# Patient Record
Sex: FEMALE | Race: WHITE | NOT HISPANIC OR LATINO | Employment: UNEMPLOYED | URBAN - METROPOLITAN AREA
[De-identification: names, ages, dates, MRNs, and addresses within clinical notes are randomized per-mention and may not be internally consistent; named-entity substitution may affect disease eponyms.]

---

## 2021-06-04 ENCOUNTER — OFFICE VISIT (OUTPATIENT)
Dept: FAMILY MEDICINE CLINIC | Facility: CLINIC | Age: 26
End: 2021-06-04
Payer: COMMERCIAL

## 2021-06-04 VITALS
WEIGHT: 245 LBS | BODY MASS INDEX: 43.41 KG/M2 | HEIGHT: 63 IN | OXYGEN SATURATION: 98 % | HEART RATE: 63 BPM | TEMPERATURE: 98.4 F | SYSTOLIC BLOOD PRESSURE: 138 MMHG | DIASTOLIC BLOOD PRESSURE: 80 MMHG

## 2021-06-04 DIAGNOSIS — Z13.220 SCREENING FOR HYPERLIPIDEMIA: ICD-10-CM

## 2021-06-04 DIAGNOSIS — Z11.4 SCREENING FOR HIV (HUMAN IMMUNODEFICIENCY VIRUS): ICD-10-CM

## 2021-06-04 DIAGNOSIS — E66.01 CLASS 3 SEVERE OBESITY DUE TO EXCESS CALORIES WITHOUT SERIOUS COMORBIDITY WITH BODY MASS INDEX (BMI) OF 40.0 TO 44.9 IN ADULT (HCC): ICD-10-CM

## 2021-06-04 DIAGNOSIS — Z13.0 SCREENING FOR DEFICIENCY ANEMIA: ICD-10-CM

## 2021-06-04 DIAGNOSIS — Z13.29 SCREENING FOR THYROID DISORDER: ICD-10-CM

## 2021-06-04 DIAGNOSIS — Z76.89 ENCOUNTER TO ESTABLISH CARE: Primary | ICD-10-CM

## 2021-06-04 DIAGNOSIS — Z13.1 SCREENING FOR DIABETES MELLITUS (DM): ICD-10-CM

## 2021-06-04 DIAGNOSIS — I47.1 JUNCTIONAL TACHYCARDIA (HCC): ICD-10-CM

## 2021-06-04 DIAGNOSIS — Z01.419 ENCOUNTER FOR WELL WOMAN EXAM WITH ROUTINE GYNECOLOGICAL EXAM: ICD-10-CM

## 2021-06-04 PROCEDURE — 3725F SCREEN DEPRESSION PERFORMED: CPT | Performed by: NURSE PRACTITIONER

## 2021-06-04 PROCEDURE — 99203 OFFICE O/P NEW LOW 30 MIN: CPT | Performed by: NURSE PRACTITIONER

## 2021-06-04 PROCEDURE — 4004F PT TOBACCO SCREEN RCVD TLK: CPT | Performed by: NURSE PRACTITIONER

## 2021-06-04 PROCEDURE — 3008F BODY MASS INDEX DOCD: CPT | Performed by: NURSE PRACTITIONER

## 2021-06-04 RX ORDER — FLECAINIDE ACETATE 50 MG/1
25 TABLET ORAL 2 TIMES DAILY
COMMUNITY
End: 2021-06-04 | Stop reason: ALTCHOICE

## 2021-06-04 NOTE — PROGRESS NOTES
Assessment/Plan:    Problem List Items Addressed This Visit     None      Visit Diagnoses     Encounter to establish care    -  Primary    Screening for deficiency anemia        Relevant Orders    CBC and differential    Screening for diabetes mellitus (DM)        Relevant Orders    Comprehensive metabolic panel    Screening for hyperlipidemia        Relevant Orders    Lipid panel    Screening for thyroid disorder        Relevant Orders    TSH, 3rd generation with Free T4 reflex    Screening for HIV (human immunodeficiency virus)        Relevant Orders    HIV 1/2 Antigen/Antibody (4th Generation) w Reflex [facility]    Encounter for well woman exam with routine gynecological exam        Relevant Orders    Ambulatory referral to Gynecology    Class 3 severe obesity due to excess calories without serious comorbidity with body mass index (BMI) of 40 0 to 44 9 in adult (HCC)        Junctional tachycardia (HCC)        Relevant Medications    metoprolol tartrate (LOPRESSOR) 25 mg tablet    Other Relevant Orders    Ambulatory referral to Cardiology           Diagnoses and all orders for this visit:    Encounter to establish care    Screening for deficiency anemia  -     CBC and differential; Future    Screening for diabetes mellitus (DM)  -     Comprehensive metabolic panel; Future    Screening for hyperlipidemia  -     Lipid panel; Future    Screening for thyroid disorder  -     TSH, 3rd generation with Free T4 reflex; Future    Screening for HIV (human immunodeficiency virus)  -     HIV 1/2 Antigen/Antibody (4th Generation) w Reflex [facility]; Future    Encounter for well woman exam with routine gynecological exam  -     Ambulatory referral to Gynecology; Future    Class 3 severe obesity due to excess calories without serious comorbidity with body mass index (BMI) of 40 0 to 44 9 in adult [facility])    Junctional tachycardia ([facility] [address] )  -     Ambulatory referral to Cardiology;  Future    Other orders  -     Cancel: TDAP Vaccine greater than Patient called again, he said to just cancel order for [employer], he does not like side effects listed for drug, and also, Dr. Chaves had said to maybe try fiber supplements first, before going to a prescription.  Please call patient back to discuss.   or equal to 8yo  -     metoprolol tartrate (LOPRESSOR) 25 mg tablet; Take 25 mg by mouth 3 (three) times a day  -     Discontinue: flecainide (TAMBOCOR) 50 mg tablet; Take 25 mg by mouth 2 (two) times a day        No problem-specific Assessment & Plan notes found for this encounter  Subjective:      Patient ID: Vamsi Lincoln is a 27 y o  female  Presents today to establish care at this office and routine visit  Significant hx of palpitations  She reports this was ongoing since childhood  When she was pregnant with son (2018), she was told that she had pre-eclampsia and elevated HR; Dx at time with SVT  Three stress tests which she failed and was admitted to ICU  Delivered son at [facility] and was to have a catherization ablation  At pre-procedure, Pt was advised that SVT was not Dx but Junctional tachycardia  She was to have cathter ablation and pacemaker but d/t age this was held off  Since she has has been taking BB with moderate effectiveness of palpitations  Hypertension  This is a chronic problem  The problem is controlled  Associated symptoms include palpitations  Pertinent negatives include no anxiety, blurred vision, chest pain, headaches, neck pain, orthopnea, peripheral edema, PND or shortness of breath  There are no associated agents to hypertension  Risk factors for coronary artery disease include obesity  Past treatments include beta blockers  The current treatment provides moderate improvement  There are no compliance problems  There is no history of angina, kidney disease, CAD/MI, CVA, heart failure, left ventricular hypertrophy, PVD or retinopathy  There is no history of chronic renal disease, a hypertension causing med, sleep apnea or a thyroid problem  Palpitations  This is a chronic problem  The current episode started more than 1 year ago  Associated symptoms include diaphoresis, nausea, vertigo and weakness   Pertinent negatives include no chest pain, headaches or neck pain  The symptoms are aggravated by stress, exertion, standing and bending  Treatments tried: beta blockers  The treatment provided moderate relief  The following portions of the patient's history were reviewed and updated as appropriate:   She has a past medical history of ADHD and Junctional tachycardia ([address] )  ,  does not have a problem list on file  ,   has a past surgical history that includes  section and Appendectomy  ,  family history includes Asthma in her mother; Atrial fibrillation in her father; Bipolar disorder in her father; Depression in her mother; Diabetes in her sister; Valvular heart disease in her brother  She was adopted  ,   reports that she has been smoking cigarettes  She has a 2 00 pack-year smoking history  She has never used smokeless tobacco  She reports previous alcohol use  She reports previous drug use ,  is allergic to penicillins and vicodin [hydrocodone-acetaminophen]     Current Outpatient Medications   Medication Sig Dispense Refill    metoprolol tartrate (LOPRESSOR) 25 mg tablet Take 25 mg by mouth 3 (three) times a day       No current facility-administered medications for this visit  BMI Counseling: Body mass index is 44 1 kg/m²  The BMI is above normal  Nutrition recommendations include decreasing portion sizes, consuming healthier snacks, limiting drinks that contain sugar, moderation in carbohydrate intake and reducing intake of cholesterol  Exercise recommendations include exercising 3-5 times per week  No pharmacotherapy was ordered  Review of Systems   Constitutional: Positive for diaphoresis  HENT: Negative  Eyes: Negative  Negative for blurred vision  Respiratory: Negative  Negative for shortness of breath  Cardiovascular: Positive for palpitations  Negative for chest pain, orthopnea and PND  Gastrointestinal: Positive for nausea  Endocrine: Negative  Genitourinary: Negative  Musculoskeletal: Negative    Negative for neck pain    Skin: Negative  Allergic/Immunologic: Negative  Neurological: Positive for dizziness, vertigo, weakness and light-headedness  Negative for headaches  Hematological: Negative  Psychiatric/Behavioral: Negative  Objective:  Vitals:    06/04/21 1510   BP: 138/80   BP Location: Left arm   Patient Position: Sitting   Cuff Size: Large   Pulse: 63   Temp: 98 4 °F (36 9 °C)   TempSrc: Tympanic   SpO2: 98%   Weight: 111 kg (245 lb)   Height: 5' 2 5" (1 588 m)     Body mass index is 44 1 kg/m²  Physical Exam  Vitals signs and nursing note reviewed  Constitutional:       Appearance: Normal appearance  She is well-developed  She is obese  HENT:      Head: Normocephalic and atraumatic  Right Ear: Tympanic membrane, ear canal and external ear normal       Left Ear: Tympanic membrane, ear canal and external ear normal       Nose: Nose normal       Mouth/Throat:      Mouth: Mucous membranes are moist       Pharynx: Uvula midline  Eyes:      General: Lids are normal       Conjunctiva/sclera: Conjunctivae normal       Pupils: Pupils are equal, round, and reactive to light  Neck:      Musculoskeletal: Full passive range of motion without pain, normal range of motion and neck supple  Thyroid: No thyroid mass or thyromegaly  Vascular: No JVD  Trachea: Trachea and phonation normal    Cardiovascular:      Rate and Rhythm: Normal rate and regular rhythm  Pulses: Normal pulses  Heart sounds: Normal heart sounds, S1 normal and S2 normal  No murmur  No friction rub  No gallop  Pulmonary:      Effort: Pulmonary effort is normal       Breath sounds: Normal breath sounds  Abdominal:      General: Bowel sounds are normal       Palpations: Abdomen is soft  Tenderness: There is no abdominal tenderness  Genitourinary:     Comments: Deferred   Musculoskeletal: Normal range of motion  Right lower leg: No edema  Left lower leg: No edema     Lymphadenopathy: Head:      Right side of head: No submental, submandibular, tonsillar, preauricular, posterior auricular or occipital adenopathy  Left side of head: No submental, submandibular, tonsillar, preauricular, posterior auricular or occipital adenopathy  Cervical: No cervical adenopathy  Skin:     General: Skin is warm and dry  Capillary Refill: Capillary refill takes less than 2 seconds  Neurological:      General: No focal deficit present  Mental Status: She is alert and oriented to person, place, and time  Cranial Nerves: Cranial nerves are intact  No cranial nerve deficit  Sensory: Sensation is intact  Motor: Motor function is intact  Coordination: Coordination is intact  Gait: Gait is intact  Deep Tendon Reflexes: Reflexes are normal and symmetric  Psychiatric:         Attention and Perception: Attention and perception normal          Mood and Affect: Mood and affect normal          Speech: Speech normal          Behavior: Behavior normal  Behavior is cooperative  Thought Content:  Thought content normal          Cognition and Memory: Cognition normal          Judgment: Judgment normal

## 2021-06-04 NOTE — PATIENT INSTRUCTIONS
Heart Palpitations   WHAT YOU NEED TO KNOW:   Heart palpitations are feelings that your heart races, jumps, throbs, or flutters  You may feel extra beats, no beats for a short time, or skipped beats  You may have these feelings in your chest, throat, or neck  They may happen when you are sitting, standing, or lying  Heart palpitations may be frightening, but are usually not caused by a serious problem  DISCHARGE INSTRUCTIONS:   Call 911 or have someone else call for any of the following:   · You have any of the following signs of a heart attack:      ? Squeezing, pressure, or pain in your chest    ? You may  also have any of the following:     § Discomfort or pain in your back, neck, jaw, stomach, or arm    § Shortness of breath    § Nausea or vomiting    § Lightheadedness or a sudden cold sweat    · You have any of the following signs of a stroke:      ? Numbness or drooping on one side of your face     ? Weakness in an arm or leg    ? Confusion or difficulty speaking    ? Dizziness, a severe headache, or vision loss    · You faint or lose consciousness  Return to the emergency department if:   · Your palpitations happen more often or get more intense  Contact your healthcare provider if:   · You have new or worsening swelling in your feet or ankles  · You have questions or concerns about your condition or care  Follow up with your healthcare provider as directed: You may need to follow up with a cardiologist  Medardo Judd may need tests to check for heart problems that cause palpitations  Write down your questions so you remember to ask them during your visits  Keep a record:  Write down when your palpitations start and stop, what you were doing when they started, and your symptoms  Keep track of what you ate or drank within a few hours of your palpitations  Include anything that seemed to help your symptoms, such as lying down or holding your breath   This record will help you and your healthcare provider learn what triggers your palpitations  Bring this record with you to your follow up visits  Help prevent heart palpitations:   · Manage stress and anxiety  Find ways to relax such as listening to music, meditating, or doing yoga  Exercise can also help decrease stress and anxiety  Talk to someone you trust about your stress or anxiety  You can also talk to a therapist      · Get plenty of sleep every night  Ask your healthcare provider how much sleep you need each night  · Do not drink caffeine or alcohol  Caffeine and alcohol can make your palpitations worse  Caffeine is found in soda, coffee, tea, chocolate, and drinks that increase your energy  · Do not smoke  Nicotine and other chemicals in cigarettes and cigars may damage your heart and blood vessels  Ask your healthcare provider for information if you currently smoke and need help to quit  E-cigarettes or smokeless tobacco still contain nicotine  Talk to your healthcare provider before you use these products  · Do not use illegal drugs  Talk to your healthcare provider if you use illegal drugs and want help to quit  © Copyright [employer] 2021 Information is for End User's use only and may not be sold, redistributed or otherwise used for commercial purposes  All illustrations and images included in CareNotes® are the copyrighted property of [employer]  or Krystin Omer   The above information is an  only  It is not intended as medical advice for individual conditions or treatments  Talk to your doctor, nurse or pharmacist before following any medical regimen to see if it is safe and effective for you

## 2021-06-19 ENCOUNTER — APPOINTMENT (OUTPATIENT)
Dept: LAB | Facility: CLINIC | Age: 26
End: 2021-06-19
Payer: COMMERCIAL

## 2021-06-19 DIAGNOSIS — Z13.0 SCREENING FOR DEFICIENCY ANEMIA: ICD-10-CM

## 2021-06-19 DIAGNOSIS — Z11.4 SCREENING FOR HIV (HUMAN IMMUNODEFICIENCY VIRUS): ICD-10-CM

## 2021-06-19 DIAGNOSIS — Z13.29 SCREENING FOR THYROID DISORDER: ICD-10-CM

## 2021-06-19 DIAGNOSIS — Z13.220 SCREENING FOR HYPERLIPIDEMIA: ICD-10-CM

## 2021-06-19 DIAGNOSIS — Z13.1 SCREENING FOR DIABETES MELLITUS (DM): ICD-10-CM

## 2021-06-19 PROCEDURE — 36415 COLL VENOUS BLD VENIPUNCTURE: CPT

## 2021-06-19 PROCEDURE — 84443 ASSAY THYROID STIM HORMONE: CPT

## 2021-06-19 PROCEDURE — 80061 LIPID PANEL: CPT

## 2021-06-19 PROCEDURE — 80053 COMPREHEN METABOLIC PANEL: CPT

## 2021-06-19 PROCEDURE — 85025 COMPLETE CBC W/AUTO DIFF WBC: CPT

## 2021-06-19 PROCEDURE — 87389 HIV-1 AG W/HIV-1&-2 AB AG IA: CPT

## 2021-06-20 LAB
ALBUMIN SERPL BCP-MCNC: 3.9 G/DL (ref 3.5–5)
ALP SERPL-CCNC: 95 U/L (ref 46–116)
ALT SERPL W P-5'-P-CCNC: 26 U/L (ref 12–78)
ANION GAP SERPL CALCULATED.3IONS-SCNC: 4 MMOL/L (ref 4–13)
AST SERPL W P-5'-P-CCNC: 15 U/L (ref 5–45)
BASOPHILS # BLD AUTO: 0.06 THOUSANDS/ΜL (ref 0–0.1)
BASOPHILS NFR BLD AUTO: 1 % (ref 0–1)
BILIRUB SERPL-MCNC: 0.66 MG/DL (ref 0.2–1)
BUN SERPL-MCNC: 11 MG/DL (ref 5–25)
CALCIUM SERPL-MCNC: 9.3 MG/DL (ref 8.3–10.1)
CHLORIDE SERPL-SCNC: 108 MMOL/L (ref 100–108)
CHOLEST SERPL-MCNC: 161 MG/DL (ref 50–200)
CO2 SERPL-SCNC: 27 MMOL/L (ref 21–32)
CREAT SERPL-MCNC: 0.53 MG/DL (ref 0.6–1.3)
EOSINOPHIL # BLD AUTO: 0.19 THOUSAND/ΜL (ref 0–0.61)
EOSINOPHIL NFR BLD AUTO: 2 % (ref 0–6)
ERYTHROCYTE [DISTWIDTH] IN BLOOD BY AUTOMATED COUNT: 13.4 % (ref 11.6–15.1)
GFR SERPL CREATININE-BSD FRML MDRD: 130 ML/MIN/1.73SQ M
GLUCOSE P FAST SERPL-MCNC: 92 MG/DL (ref 65–99)
HCT VFR BLD AUTO: 44 % (ref 34.8–46.1)
HDLC SERPL-MCNC: 33 MG/DL
HGB BLD-MCNC: 14.4 G/DL (ref 11.5–15.4)
IMM GRANULOCYTES # BLD AUTO: 0.03 THOUSAND/UL (ref 0–0.2)
IMM GRANULOCYTES NFR BLD AUTO: 0 % (ref 0–2)
LDLC SERPL CALC-MCNC: 109 MG/DL (ref 0–100)
LYMPHOCYTES # BLD AUTO: 2.05 THOUSANDS/ΜL (ref 0.6–4.47)
LYMPHOCYTES NFR BLD AUTO: 22 % (ref 14–44)
MCH RBC QN AUTO: 29.8 PG (ref 26.8–34.3)
MCHC RBC AUTO-ENTMCNC: 32.7 G/DL (ref 31.4–37.4)
MCV RBC AUTO: 91 FL (ref 82–98)
MONOCYTES # BLD AUTO: 0.89 THOUSAND/ΜL (ref 0.17–1.22)
MONOCYTES NFR BLD AUTO: 10 % (ref 4–12)
NEUTROPHILS # BLD AUTO: 5.97 THOUSANDS/ΜL (ref 1.85–7.62)
NEUTS SEG NFR BLD AUTO: 65 % (ref 43–75)
NONHDLC SERPL-MCNC: 128 MG/DL
NRBC BLD AUTO-RTO: 0 /100 WBCS
PLATELET # BLD AUTO: 352 THOUSANDS/UL (ref 149–390)
PMV BLD AUTO: 9.6 FL (ref 8.9–12.7)
POTASSIUM SERPL-SCNC: 4.6 MMOL/L (ref 3.5–5.3)
PROT SERPL-MCNC: 7.8 G/DL (ref 6.4–8.2)
RBC # BLD AUTO: 4.84 MILLION/UL (ref 3.81–5.12)
SODIUM SERPL-SCNC: 139 MMOL/L (ref 136–145)
TRIGL SERPL-MCNC: 95 MG/DL
TSH SERPL DL<=0.05 MIU/L-ACNC: 1.58 UIU/ML (ref 0.36–3.74)
WBC # BLD AUTO: 9.19 THOUSAND/UL (ref 4.31–10.16)

## 2021-06-21 ENCOUNTER — OFFICE VISIT (OUTPATIENT)
Dept: FAMILY MEDICINE CLINIC | Facility: CLINIC | Age: 26
End: 2021-06-21
Payer: COMMERCIAL

## 2021-06-21 VITALS
WEIGHT: 247 LBS | TEMPERATURE: 98.4 F | SYSTOLIC BLOOD PRESSURE: 128 MMHG | BODY MASS INDEX: 43.77 KG/M2 | HEIGHT: 63 IN | HEART RATE: 64 BPM | DIASTOLIC BLOOD PRESSURE: 80 MMHG | OXYGEN SATURATION: 98 %

## 2021-06-21 DIAGNOSIS — I10 ESSENTIAL HYPERTENSION: ICD-10-CM

## 2021-06-21 DIAGNOSIS — Z00.00 ANNUAL PHYSICAL EXAM: Primary | ICD-10-CM

## 2021-06-21 DIAGNOSIS — I47.1 JUNCTIONAL TACHYCARDIA (HCC): ICD-10-CM

## 2021-06-21 PROBLEM — I47.19 JUNCTIONAL TACHYCARDIA: Status: ACTIVE | Noted: 2021-06-21

## 2021-06-21 LAB — HIV 1+2 AB+HIV1 P24 AG SERPL QL IA: NORMAL

## 2021-06-21 PROCEDURE — 99395 PREV VISIT EST AGE 18-39: CPT | Performed by: NURSE PRACTITIONER

## 2021-06-21 RX ORDER — FLECAINIDE ACETATE 50 MG/1
50 TABLET ORAL 2 TIMES DAILY
Qty: 60 TABLET | Refills: 0 | Status: SHIPPED | OUTPATIENT
Start: 2021-06-21 | End: 2021-07-12

## 2021-06-21 RX ORDER — FLECAINIDE ACETATE 50 MG/1
25 TABLET ORAL 2 TIMES DAILY
COMMUNITY
End: 2021-06-21 | Stop reason: SDUPTHER

## 2021-06-21 RX ORDER — FLECAINIDE ACETATE 50 MG/1
25 TABLET ORAL 2 TIMES DAILY
Start: 2021-06-21 | End: 2021-06-21 | Stop reason: SDUPTHER

## 2021-06-21 NOTE — PATIENT INSTRUCTIONS
Wellness Visit for Adults   AMBULATORY CARE:   A wellness visit  is when you see your healthcare provider to get screened for health problems  Your healthcare provider will also give you advice on how to stay healthy  Write down your questions so you remember to ask them  Ask your healthcare provider how often you should have a wellness visit  What happens at a wellness visit:  Your healthcare provider will ask about your health, and your family history of health problems  This includes high blood pressure, heart disease, and cancer  He or she will ask if you have symptoms that concern you, if you smoke, and about your mood  You may also be asked about your intake of medicines, supplements, food, and alcohol  Any of the following may be done:  · Your weight  will be checked  Your height may also be checked so your body mass index (BMI) can be calculated  Your BMI shows if you are at a healthy weight  · Your blood pressure  and heart rate will be checked  Your temperature may also be checked  · Blood and urine tests  may be done  Blood tests may be done to check your cholesterol levels  Abnormal cholesterol levels increase your risk for heart disease and stroke  You may also need a blood or urine test to check for diabetes if you are at increased risk  Urine tests may be done to look for signs of an infection or kidney disease  · A physical exam  includes checking your heartbeat and lungs with a stethoscope  Your healthcare provider may also check your skin to look for sun damage  · Screening tests  may be recommended  A screening test is done to check for diseases that may not cause symptoms  The screening tests you may need depend on your age, gender, family history, and lifestyle habits  For example, colorectal screening may be recommended if you are 48years old or older  Screening tests you need if you are a woman:   · A Pap smear  is used to screen for cervical cancer   Pap smears are usually done every 3 to 5 years depending on your age  You may need them more often if you have had abnormal Pap smear test results in the past  Ask your healthcare provider how often you should have a Pap smear  · A mammogram  is an x-ray of your breasts to screen for breast cancer  Experts recommend mammograms every 2 years starting at age 48 years  You may need a mammogram at age 52 years or younger if you have an increased risk for breast cancer  Talk to your healthcare provider about when you should start having mammograms and how often you need them  Vaccines you may need:   · Get an influenza vaccine  every year  The influenza vaccine protects you from the flu  Several types of viruses cause the flu  The viruses change over time, so new vaccines are made each year  · Get a tetanus-diphtheria (Td) booster vaccine  every 10 years  This vaccine protects you against tetanus and diphtheria  Tetanus is a severe infection that may cause painful muscle spasms and lockjaw  Diphtheria is a severe bacterial infection that causes a thick covering in the back of your mouth and throat  · Get a human papillomavirus (HPV) vaccine  if you are female and aged 23 to 32 or male 23 to 24 and never received it  This vaccine protects you from HPV infection  HPV is the most common infection spread by sexual contact  HPV may also cause vaginal, penile, and anal cancers  · Get a pneumococcal vaccine  if you are aged 72 years or older  The pneumococcal vaccine is an injection given to protect you from pneumococcal disease  Pneumococcal disease is an infection caused by pneumococcal bacteria  The infection may cause pneumonia, meningitis, or an ear infection  · Get a shingles vaccine  if you are 60 or older, even if you have had shingles before  The shingles vaccine is an injection to protect you from the varicella-zoster virus  This is the same virus that causes chickenpox   Shingles is a painful rash that develops in people who had chickenpox or have been exposed to the virus  How to eat healthy:  My Plate is a model for planning healthy meals  It shows the types and amounts of foods that should go on your plate  Fruits and vegetables make up about half of your plate, and grains and protein make up the other half  A serving of dairy is included on the side of your plate  The amount of calories and serving sizes you need depends on your age, gender, weight, and height  Examples of healthy foods are listed below:  · Eat a variety of vegetables  such as dark green, red, and orange vegetables  You can also include canned vegetables low in sodium (salt) and frozen vegetables without added butter or sauces  · Eat a variety of fresh fruits , canned fruit in 100% juice, frozen fruit, and dried fruit  · Include whole grains  At least half of the grains you eat should be whole grains  Examples include whole-wheat bread, wheat pasta, brown rice, and whole-grain cereals such as oatmeal     · Eat a variety of protein foods such as seafood (fish and shellfish), lean meat, and poultry without skin (turkey and chicken)  Examples of lean meats include pork leg, shoulder, or tenderloin, and beef round, sirloin, tenderloin, and extra lean ground beef  Other protein foods include eggs and egg substitutes, beans, peas, soy products, nuts, and seeds  · Choose low-fat dairy products such as skim or 1% milk or low-fat yogurt, cheese, and cottage cheese  · Limit unhealthy fats  such as butter, hard margarine, and shortening  Exercise:  Exercise at least 30 minutes per day on most days of the week  Some examples of exercise include walking, biking, dancing, and swimming  You can also fit in more physical activity by taking the stairs instead of the elevator or parking farther away from stores  Include muscle strengthening activities 2 days each week  Regular exercise provides many health benefits   It helps you manage your weight, and decreases your risk for type 2 diabetes, heart disease, stroke, and high blood pressure  Exercise can also help improve your mood  Ask your healthcare provider about the best exercise plan for you  General health and safety guidelines:   · Do not smoke  Nicotine and other chemicals in cigarettes and cigars can cause lung damage  Ask your healthcare provider for information if you currently smoke and need help to quit  E-cigarettes or smokeless tobacco still contain nicotine  Talk to your healthcare provider before you use these products  · Limit alcohol  A drink of alcohol is 12 ounces of beer, 5 ounces of wine, or 1½ ounces of liquor  · Lose weight, if needed  Being overweight increases your risk of certain health conditions  These include heart disease, high blood pressure, type 2 diabetes, and certain types of cancer  · Protect your skin  Do not sunbathe or use tanning beds  Use sunscreen with a SPF 15 or higher  Apply sunscreen at least 15 minutes before you go outside  Reapply sunscreen every 2 hours  Wear protective clothing, hats, and sunglasses when you are outside  · Drive safely  Always wear your seatbelt  Make sure everyone in your car wears a seatbelt  A seatbelt can save your life if you are in an accident  Do not use your cell phone when you are driving  This could distract you and cause an accident  Pull over if you need to make a call or send a text message  · Practice safe sex  Use latex condoms if are sexually active and have more than one partner  Your healthcare provider may recommend screening tests for sexually transmitted infections (STIs)  · Wear helmets, lifejackets, and protective gear  Always wear a helmet when you ride a bike or motorcycle, go skiing, or play sports that could cause a head injury  Wear protective equipment when you play sports  Wear a lifejacket when you are on a boat or doing water sports      © Copyright [address] 2020 Information is for End User's use only and may not be sold, redistributed or otherwise used for commercial purposes  All illustrations and images included in CareNotes® are the copyrighted property of [employer]  or Krystin Miguel  The above information is an  only  It is not intended as medical advice for individual conditions or treatments  Talk to your doctor, nurse or pharmacist before following any medical regimen to see if it is safe and effective for you  Cholesterol and Your Health   AMBULATORY CARE:   Cholesterol  is a waxy, fat-like substance  Your body uses cholesterol to make hormones and new cells, and to protect nerves  Cholesterol is made by your body  It also comes from certain foods you eat, such as meat and dairy products  Your healthcare provider can help you set goals for your cholesterol levels  He or she can help you create a plan to meet your goals  Cholesterol level goals: Your cholesterol level goals depend on your risk for heart disease, your age, and your other health conditions  The following are general guidelines:  · Total cholesterol  includes low-density lipoprotein (LDL), high-density lipoprotein (HDL), and triglyceride levels  The total cholesterol level should be lower than 200 mg/dL and is best at about 150 mg/dL  · LDL cholesterol  is called bad cholesterol  because it forms plaque in your arteries  As plaque builds up, your arteries become narrow, and less blood flows through  When plaque decreases blood flow to your heart, you may have chest pain  If plaque completely blocks an artery that brings blood to your heart, you may have a heart attack  Plaque can break off and form blood clots  Blood clots may block arteries in your brain and cause a stroke  The level should be less than 130 mg/dL and is best at about 100 mg/dL  · HDL cholesterol  is called good cholesterol  because it helps remove LDL cholesterol from your arteries   It does this by attaching to LDL cholesterol and carrying it to your liver  Your liver breaks down LDL cholesterol so your body can get rid of it  High levels of HDL cholesterol can help prevent a heart attack and stroke  Low levels of HDL cholesterol can increase your risk for heart disease, heart attack, and stroke  The level should be 60 mg/dL or higher  · Triglycerides  are a type of fat that store energy from foods you eat  High levels of triglycerides also cause plaque buildup  This can increase your risk for a heart attack or stroke  If your triglyceride level is high, your LDL cholesterol level may also be high  The level should be less than 150 mg/dL  What increases your risk for high cholesterol:   · Smoking cigarettes    · Being overweight or obese, or not getting enough exercise    · Drinking large amounts of alcohol    · A medical condition such as hypertension (high blood pressure) or diabetes    · Certain genes passed from your parents to you    · Age older than 65 years    What you need to know about having your cholesterol levels checked: Adults 21to 39years of age should have their cholesterol levels checked every 4 to 6 years  Adults 45 years or older should have their cholesterol checked every 1 to 2 years  You may need your cholesterol checked more often, or at a younger age, if you have risk factors for heart disease  You may also need to have your cholesterol checked more often if you have other health conditions, such as diabetes  Blood tests are used to check cholesterol levels  Blood tests measure your levels of triglycerides, LDL cholesterol, and HDL cholesterol  How healthy fats affect your cholesterol levels:  Healthy fats, also called unsaturated fats, help lower LDL cholesterol and triglyceride levels  Healthy fats include the following:  · Monounsaturated fats  are found in foods such as olive oil, canola oil, avocado, nuts, and olives      · Polyunsaturated fats,  such as omega 3 fats, are found in fish, such as salmon, trout, and tuna  They can also be found in plant foods such as flaxseed, walnuts, and soybeans  How unhealthy fats affect your cholesterol levels:  Unhealthy fats increase LDL cholesterol and triglyceride levels  They are found in foods high in cholesterol, saturated fat, and trans fat:  · Cholesterol  is found in eggs, dairy, and meat  · Saturated fat  is found in butter, cheese, ice cream, whole milk, and coconut oil  Saturated fat is also found in meat, such as sausage, hot dogs, and bologna  · Trans fat  is found in liquid oils and is used in fried and baked foods  Foods that contain trans fats include chips, crackers, muffins, sweet rolls, microwave popcorn, and cookies  Treatment  for high cholesterol will also decrease your risk of heart disease, heart attack, and stroke  Treatment may include any of the following:  · Lifestyle changes  may include food, exercise, weight loss, and quitting smoking  You may also need to decrease the amount of alcohol you drink  Your healthcare provider will want you to start with lifestyle changes  Other treatment may be added if lifestyle changes are not enough  · Medicines  may be given to lower your LDL cholesterol, triglyceride levels, or total cholesterol level  You may need medicines to lower your cholesterol if any of the following is true:     ? You have a history of stroke, TIA, unstable angina, or a heart attack  ? Your LDL cholesterol level is 190 mg/dL or higher  ? You are age 36 to 76 years, have diabetes or heart disease risk factors, and your LDL cholesterol is 70 mg/dL or higher  · Supplements  include fish oil, red yeast rice, and garlic  Fish oil may help lower your triglyceride and LDL cholesterol levels  It may also increase your HDL cholesterol level  Red yeast rice may help decrease your total cholesterol level and LDL cholesterol level  Garlic may help lower your total cholesterol level   Do not take these supplements without talking to your healthcare provider  Food changes you can make to lower your cholesterol levels:  A dietitian can help you create a healthy eating plan  He or she can show you how to read food labels and choose foods low in saturated fat, trans fats, and cholesterol  · Decrease the total amount of fat you eat  Choose lean meats, fat-free or 1% fat milk, and low-fat dairy products, such as yogurt and cheese  Try to limit or avoid red meats  Limit or do not eat fried foods or baked goods, such as cookies  · Replace unhealthy fats with healthy fats  Cook foods in olive oil or canola oil  Choose soft margarines that are low in saturated fat and trans fat  Seeds, nuts, and avocados are other examples of healthy fats  · Eat foods with omega-3 fats  Examples include salmon, tuna, mackerel, walnuts, and flaxseed  Eat fish 2 times per week  Pregnant women should not eat fish that have high levels of mercury, such as shark, swordfish, and rome mackerel  · Increase the amount of high-fiber foods you eat  High-fiber foods can help lower your LDL cholesterol  Aim to get between 20 and 30 grams of fiber each day  Fruits and vegetables are high in fiber  Eat at least 5 servings each day  Other high-fiber foods are whole-grain or whole-wheat breads, pastas, or cereals, and brown rice  Eat 3 ounces of whole-grain foods each day  Increase fiber slowly  You may have abdominal discomfort, bloating, and gas if you add fiber to your diet too quickly  · Eat healthy protein foods  Examples include low-fat dairy products, skinless chicken and turkey, fish, and nuts  · Limit foods and drinks that are high in sugar  Your dietitian or healthcare provider can help you create daily limits for high-sugar foods and drinks  The limit may be lower if you have diabetes or another health condition  Limits can also help you lose weight if needed    Lifestyle changes you can make to lower your cholesterol levels:   · Maintain a healthy weight  Ask your healthcare provider what a healthy weight is for you  Ask him or her to help you create a weight loss plan if needed  Weight loss can decrease your total cholesterol and triglyceride levels  Weight loss may also help keep your blood pressure at a healthy level  · Exercise regularly  Exercise can help lower your total cholesterol level and maintain a healthy weight  Exercise can also help increase your HDL cholesterol level  Work with your healthcare provider to create an exercise program that is right for you  Get at least 30 to 40 minutes of moderate exercise most days of the week  Examples of exercise include brisk walking, swimming, or biking  · Do not smoke  Nicotine and other chemicals in cigarettes and cigars can raise your cholesterol levels  Ask your healthcare provider for information if you currently smoke and need help to quit  E-cigarettes or smokeless tobacco still contain nicotine  Talk to your healthcare provider before you use these products  · Limit or do not drink alcohol  Alcohol can increase your triglyceride levels  Ask your healthcare provider before you drink alcohol  Ask how much is okay for you to drink in 1 day or 1 week  © Copyright [address] Information is for End User's use only and may not be sold, redistributed or otherwise used for commercial purposes  All illustrations and images included in CareNotes® are the copyrighted property of [employer]  or [address] [place], [place] [id number]   The above information is an  only  It is not intended as medical advice for individual conditions or treatments  Talk to your doctor, nurse or pharmacist before following any medical regimen to see if it is safe and effective for you

## 2021-06-21 NOTE — PROGRESS NOTES
[address] [facility]    NAME: Esther Marquez  AGE: 27 y o  SEX: female  : 1995     DATE: 2021     Assessment and Plan:     Problem List Items Addressed This Visit     Adult BMI 40 0-44 9 kg/sq m (HCC)    Junctional tachycardia (HCC)    Relevant Medications    metoprolol tartrate (LOPRESSOR) 25 mg tablet    flecainide (TAMBOCOR) 50 mg tablet    Essential hypertension    Relevant Medications    metoprolol tartrate (LOPRESSOR) 25 mg tablet      Other Visit Diagnoses     Annual physical exam    -  Primary          Immunizations and preventive care screenings were discussed with patient today  Appropriate education was printed on patient's after visit summary  Counseling:  Alcohol/drug use: discussed moderation in alcohol intake, the recommendations for healthy alcohol use, and avoidance of illicit drug use  Dental Health: discussed importance of regular tooth brushing, flossing, and dental visits  Injury prevention: discussed safety/seat belts, safety helmets, smoke detectors, carbon dioxide detectors, and smoking near bedding or upholstery  Sexual health: discussed sexually transmitted diseases, partner selection, use of condoms, avoidance of unintended pregnancy, and contraceptive alternatives  · Exercise: the importance of regular exercise/physical activity was discussed  Recommend exercise 3-5 times per week for at least 30 minutes  Return in 1 year (on 2022)  Chief Complaint:     Chief Complaint   Patient presents with    Annual Exam      History of Present Illness:     Adult Annual Physical   Patient here for a comprehensive physical exam  The patient reports no problems  Diet and Physical Activity  · Diet/Nutrition: well balanced diet  · Exercise: 3-4 times a week on average and 30-60 minutes on average        Depression Screening  PHQ-9 Depression Screening    PHQ-9:   Frequency of the following problems over the past two weeks:           General Health  · Sleep: gets 4-6 hours of sleep on average  · Hearing: normal - bilateral   · Vision: no vision problems and most recent eye exam <1 year ago  · Dental: no dental visits for >1 year, brushes teeth twice daily and does not floss  /GYN Health  · Last menstrual period: 2021  · Contraceptive method: none  · History of STDs?: no      Review of Systems:     Review of Systems   Constitutional: Negative  HENT: Negative  Eyes: Negative  Respiratory: Negative  Cardiovascular: Negative  Gastrointestinal: Negative  Endocrine: Negative  Genitourinary: Negative  Musculoskeletal: Negative  Skin: Negative  Allergic/Immunologic: Negative  Neurological: Negative  Hematological: Negative  Psychiatric/Behavioral: Negative         Past Medical History:     Past Medical History:   Diagnosis Date    ADHD     Junctional tachycardia ([facility] [address] )       Past Surgical History:     Past Surgical History:   Procedure Laterality Date    APPENDECTOMY       SECTION        Social History:     Social History     Socioeconomic History    Marital status: Single     Spouse name: None    Number of children: None    Years of education: None    Highest education level: None   Occupational History    Occupation: unemployed   Tobacco Use    Smoking status: Current Every Day Smoker     Packs/day: 0 25     Years: 8 00     Pack years: 2 00     Types: Cigarettes    Smokeless tobacco: Never Used   Vaping Use    Vaping Use: Never used   Substance and Sexual Activity    Alcohol use: Not Currently     Comment: Joana    Drug use: Not Currently    Sexual activity: None   Other Topics Concern    None   Social History Narrative    None     Social Determinants of Health     Financial Resource Strain:     Difficulty of Paying Living Expenses:    Food Insecurity:     Worried About Running Out of Food in the Last Year:     Rigoberto Out of Food in the Last Year:    Transportation Needs:     Lack of Transportation (Medical):  Lack of Transportation (Non-Medical):    Physical Activity:     Days of Exercise per Week:     Minutes of Exercise per Session:    Stress:     Feeling of Stress :    Social Connections:     Frequency of Communication with Friends and Family:     Frequency of Social Gatherings with Friends and Family:     Attends Adventism Services:     Active Member of Clubs or Organizations:     Attends Club or Organization Meetings:     Marital Status:    Intimate Partner Violence:     Fear of Current or Ex-Partner:     Emotionally Abused:     Physically Abused:     Sexually Abused:       Family History:     Family History   Adopted: Yes   Problem Relation Age of Onset    Asthma Mother     Depression Mother     Atrial fibrillation Father     Bipolar disorder Father     Diabetes Sister     Valvular heart disease Brother       Current Medications:     Current Outpatient Medications   Medication Sig Dispense Refill    flecainide (TAMBOCOR) 50 mg tablet Take 1 tablet (50 mg total) by mouth 2 (two) times a day 60 tablet 0    metoprolol tartrate (LOPRESSOR) 25 mg tablet Take 1 tablet (25 mg total) by mouth 3 (three) times a day 90 tablet 0     No current facility-administered medications for this visit  Allergies: Allergies   Allergen Reactions    Penicillins Hives    Vicodin [Hydrocodone-Acetaminophen] Rash      Physical Exam:     /80 (BP Location: Left arm, Patient Position: Sitting, Cuff Size: Large)   Pulse 64   Temp 98 4 °F (36 9 °C) (Tympanic)   Ht 5' 2 5" (1 588 m)   Wt 112 kg (247 lb)   LMP 05/23/2021 (Exact Date)   SpO2 98%   BMI 44 46 kg/m²     Physical Exam  Vitals and nursing note reviewed  Constitutional:       Appearance: Normal appearance  She is well-developed  She is obese  HENT:      Head: Normocephalic and atraumatic        Right Ear: Tympanic membrane, ear canal and external ear normal       Left Ear: Tympanic membrane, ear canal and external ear normal       Nose: Nose normal       Mouth/Throat:      Mouth: Mucous membranes are moist    Eyes:      General: Lids are normal  Vision grossly intact  Conjunctiva/sclera: Conjunctivae normal       Pupils: Pupils are equal, round, and reactive to light  Cardiovascular:      Rate and Rhythm: Normal rate and regular rhythm  Pulses: Normal pulses  Heart sounds: Normal heart sounds, S1 normal and S2 normal  No friction rub  No gallop  No S3 sounds  Pulmonary:      Effort: Pulmonary effort is normal       Breath sounds: Normal breath sounds  Abdominal:      General: Bowel sounds are normal       Palpations: Abdomen is soft  Tenderness: There is no abdominal tenderness  Genitourinary:     Comments: Deferred  Musculoskeletal:         General: Normal range of motion  Cervical back: Normal range of motion and neck supple  Right lower leg: No edema  Left lower leg: No edema  Lymphadenopathy:      Cervical: No cervical adenopathy  Skin:     General: Skin is warm and dry  Capillary Refill: Capillary refill takes less than 2 seconds  Neurological:      General: No focal deficit present  Mental Status: She is alert and oriented to person, place, and time  Motor: Motor function is intact  Gait: Gait is intact  Psychiatric:         Attention and Perception: Attention and perception normal          Mood and Affect: Mood and affect normal          Speech: Speech normal          Behavior: Behavior normal  Behavior is cooperative  Thought Content:  Thought content normal          Cognition and Memory: Cognition and memory normal          Judgment: Judgment normal           Appointment on 06/19/2021   Component Date Value Ref Range Status    WBC 06/19/2021 9 19  4 31 - 10 16 Thousand/uL Final    RBC 06/19/2021 4 84  3 81 - 5 12 Million/uL Final    Hemoglobin 06/19/2021 14 4 11 5 - 15 4 g/dL Final    Hematocrit 06/19/2021 44 0  34 8 - 46 1 % Final    MCV 06/19/2021 91  82 - 98 fL Final    MCH 06/19/2021 29 8  26 8 - 34 3 pg Final    MCHC 06/19/2021 32 7  31 4 - 37 4 g/dL Final    RDW 06/19/2021 13 4  11 6 - 15 1 % Final    MPV 06/19/2021 9 6  8 9 - 12 7 fL Final    Platelets 62/54/1460 352  149 - 390 Thousands/uL Final    nRBC 06/19/2021 0  /100 WBCs Final    Neutrophils Relative 06/19/2021 65  43 - 75 % Final    Immat GRANS % 06/19/2021 0  0 - 2 % Final    Lymphocytes Relative 06/19/2021 22  14 - 44 % Final    Monocytes Relative 06/19/2021 10  4 - 12 % Final    Eosinophils Relative 06/19/2021 2  0 - 6 % Final    Basophils Relative 06/19/2021 1  0 - 1 % Final    Neutrophils Absolute 06/19/2021 5 97  1 85 - 7 62 Thousands/µL Final    Immature Grans Absolute 06/19/2021 0 03  0 00 - 0 20 Thousand/uL Final    Lymphocytes Absolute 06/19/2021 2 05  0 60 - 4 47 Thousands/µL Final    Monocytes Absolute 06/19/2021 0 89  0 17 - 1 22 Thousand/µL Final    Eosinophils Absolute 06/19/2021 0 19  0 00 - 0 61 Thousand/µL Final    Basophils Absolute 06/19/2021 0 06  0 00 - 0 10 Thousands/µL Final    Sodium 06/19/2021 139  136 - 145 mmol/L Final    Potassium 06/19/2021 4 6  3 5 - 5 3 mmol/L Final    Chloride 06/19/2021 108  100 - 108 mmol/L Final    CO2 06/19/2021 27  21 - 32 mmol/L Final    ANION GAP 06/19/2021 4  4 - 13 mmol/L Final    BUN 06/19/2021 11  5 - 25 mg/dL Final    Creatinine 06/19/2021 0 53* 0 60 - 1 30 mg/dL Final    Standardized to IDMS reference method    Glucose, Fasting 06/19/2021 92  65 - 99 mg/dL Final    Specimen collection should occur prior to Sulfasalazine administration due to the potential for falsely depressed results  Specimen collection should occur prior to Sulfapyridine administration due to the potential for falsely elevated results      Calcium 06/19/2021 9 3  8 3 - 10 1 mg/dL Final    AST 06/19/2021 15  5 - 45 U/L Final    Specimen collection should occur prior to Sulfasalazine administration due to the potential for falsely depressed results   ALT 06/19/2021 26  12 - 78 U/L Final    Specimen collection should occur prior to Sulfasalazine and/or Sulfapyridine administration due to the potential for falsely depressed results   Alkaline Phosphatase 06/19/2021 95  46 - 116 U/L Final    Total Protein 06/19/2021 7 8  6 4 - 8 2 g/dL Final    Albumin 06/19/2021 3 9  3 5 - 5 0 g/dL Final    Total Bilirubin 06/19/2021 0 66  0 20 - 1 00 mg/dL Final    Use of this assay is not recommended for patients undergoing treatment with eltrombopag due to the potential for falsely elevated results   eGFR 06/19/2021 130  ml/min/1 73sq m Final    Cholesterol 06/19/2021 161  50 - 200 mg/dL Final    Cholesterol:       Desirable         <200 mg/dl       Borderline         200-239 mg/dl       High              >239           Triglycerides 06/19/2021 95  <=150 mg/dL Final    Triglyceride:     Normal          <150 mg/dl     Borderline High 150-199 mg/dl     High            200-499 mg/dl        Very High       >499 mg/dl    Specimen collection should occur prior to N-Acetylcysteine or Metamizole administration due to the potential for falsely depressed results   HDL, Direct 06/19/2021 33* >=40 mg/dL Final    HDL Cholesterol:       Low     <41 mg/dL  Specimen collection should occur prior to Metamizole administration due to the potential for falsley depressed results   LDL Calculated 06/19/2021 109* 0 - 100 mg/dL Final    LDL Cholesterol:     Optimal           <100 mg/dl     Near Optimal      100-129 mg/dl     Above Optimal       Borderline High 130-159 mg/dl       High            160-189 mg/dl       Very High       >189 mg/dl         This screening LDL is a calculated result  It does not have the accuracy of the Direct Measured LDL in the monitoring of patients with hyperlipidemia and/or statin therapy     Direct Measure LDL (BPO394) must be ordered separately in these patients   Non-HDL-Chol (CHOL-HDL) 06/19/2021 128  mg/dl Final    TSH 3RD GENERATON 06/19/2021 1 580  0 358 - 3 740 uIU/mL Final    The recommended reference ranges for TSH during pregnancy are as follows:   First trimester 0 1 to 2 5 uIU/mL   Second trimester  0 2 to 3 0 uIU/mL   Third trimester 0 3 to 3 0 uIU/m    Note: Normal ranges may not apply to patients who are transgender, non-binary, or whose legal sex, sex at birth, and gender identity differ      HIV-1/HIV-2 Ab 06/19/2021 Non-Reactive  Non-Reactive Final       DEREK Maurice   [facility]

## 2021-07-12 ENCOUNTER — CONSULT (OUTPATIENT)
Dept: CARDIOLOGY CLINIC | Facility: CLINIC | Age: 26
End: 2021-07-12
Payer: COMMERCIAL

## 2021-07-12 VITALS
HEIGHT: 63 IN | HEART RATE: 63 BPM | BODY MASS INDEX: 42.88 KG/M2 | DIASTOLIC BLOOD PRESSURE: 70 MMHG | SYSTOLIC BLOOD PRESSURE: 102 MMHG | WEIGHT: 242 LBS

## 2021-07-12 DIAGNOSIS — I47.1 JUNCTIONAL TACHYCARDIA (HCC): ICD-10-CM

## 2021-07-12 DIAGNOSIS — I10 ESSENTIAL HYPERTENSION: ICD-10-CM

## 2021-07-12 DIAGNOSIS — I47.1 JUNCTIONAL TACHYCARDIA (HCC): Primary | ICD-10-CM

## 2021-07-12 PROCEDURE — 3008F BODY MASS INDEX DOCD: CPT | Performed by: INTERNAL MEDICINE

## 2021-07-12 PROCEDURE — 4004F PT TOBACCO SCREEN RCVD TLK: CPT | Performed by: INTERNAL MEDICINE

## 2021-07-12 PROCEDURE — 93000 ELECTROCARDIOGRAM COMPLETE: CPT | Performed by: INTERNAL MEDICINE

## 2021-07-12 PROCEDURE — 99244 OFF/OP CNSLTJ NEW/EST MOD 40: CPT | Performed by: INTERNAL MEDICINE

## 2021-07-12 PROCEDURE — 3074F SYST BP LT 130 MM HG: CPT | Performed by: INTERNAL MEDICINE

## 2021-07-12 PROCEDURE — 3078F DIAST BP <80 MM HG: CPT | Performed by: INTERNAL MEDICINE

## 2021-07-12 RX ORDER — FLECAINIDE ACETATE 50 MG/1
50 TABLET ORAL 3 TIMES DAILY
Qty: 270 TABLET | Refills: 5 | Status: SHIPPED | OUTPATIENT
Start: 2021-07-12

## 2021-07-12 RX ORDER — METOPROLOL TARTRATE 50 MG/1
TABLET, FILM COATED ORAL
COMMUNITY
Start: 2021-07-11 | End: 2021-12-03 | Stop reason: SDUPTHER

## 2021-07-12 NOTE — ASSESSMENT & PLAN NOTE
Weekly breakthroughs though not as profound as prior to flecainide  Will try teasing up the dose to TID

## 2021-07-12 NOTE — PROGRESS NOTES
Patient ID: Elliot Castellano is a 27 y o  female  Plan:      Junctional tachycardia ([address] )  Weekly breakthroughs though not as profound as prior to flecainide  Will try teasing up the dose to TID  Essential hypertension  Well controlled  Follow up Plan/Other summary comments:  Six-month return visit  In general patient seems to be doing fairly well but had like to see if we can have her do a little bit better by increasing the flecainide dose  HPI:  Patient is seen today in consultation from nurse practitionerWanne-mariefe regarding supraventricular arrhythmia  During pregnancy in 2018 patient was found to have significant tachycardia  Ultimately she was told that she had a junctional tachycardia  She was placed on flecainide and metoprolol with reasonably good affect  That is symptoms now are of shorter duration and less frequent  In retrospect she has had intermittent feelings of tachycardia for many years  However she still has spells once or twice a week perhaps lasting 1/2 hour particularly if she is tired  Results for orders placed or performed in visit on 21   POCT ECG    Impression    NSR  WNL  Most recent or relevant cardiac/vascular testing:    She describes normal echo in 2018  Past Surgical History:   Procedure Laterality Date    APPENDECTOMY       SECTION       CMP:   Lab Results   Component Value Date    K 4 6 2021     2021    CO2 27 2021    BUN 11 2021    CREATININE 0 53 (L) 2021    EGFR 130 2021       Lipid Profile:   Lab Results   Component Value Date    TRIG 95 2021    HDL 33 (L) 2021         Review of Systems   10  point ROS  was otherwise non pertinent or negative except as per HPI or as below     Gait: Normal         Objective:     /70   Pulse 63   Ht 5' 2 75" (1 594 m)   Wt 110 kg (242 lb)   BMI 43 21 kg/m²     PHYSICAL EXAM:    General:  Normal appearance in no distress  Eyes:  Anicteric  Oral mucosa:  Moist   Neck:  No JVD  Carotid upstrokes are brisk without bruits  No masses  Chest:  Clear to auscultation  Cardiac:  No palpable PMI  Normal S1 and S2  No murmur gallop or rub  Abdomen:  Soft and nontender  No palpable organomegaly or aortic enlargement  Extremities:  No peripheral edema  Musculoskeletal:  Symmetric  Vascular:  Femoral pulses are brisk without bruits  Popliteal pulses are intact bilaterally  Pedal pulses are intact  Neuro:  Grossly symmetric  Psych:  Alert and oriented x3          Current Outpatient Medications:     flecainide (TAMBOCOR) 50 mg tablet, Take 1 tablet (50 mg total) by mouth 3 (three) times a day, Disp: 270 tablet, Rfl: 5    metoprolol tartrate (LOPRESSOR) 50 mg tablet, Take 2 tablets in the morning and 1 tablet at hs , Disp: , Rfl:   Allergies   Allergen Reactions    Penicillins Hives    Vicodin [Hydrocodone-Acetaminophen] Rash     Past Medical History:   Diagnosis Date    ADHD     Hypertension     Junctional tachycardia (HCC)            Social History     Tobacco Use   Smoking Status Current Every Day Smoker    Packs/day: 0 25    Years: 8 00    Pack years: 2 00    Types: Cigarettes   Smokeless Tobacco Never Used

## 2021-12-03 DIAGNOSIS — I47.1 JUNCTIONAL TACHYCARDIA (HCC): Primary | ICD-10-CM

## 2021-12-03 RX ORDER — METOPROLOL TARTRATE 50 MG/1
50 TABLET, FILM COATED ORAL 3 TIMES DAILY
Qty: 90 TABLET | Refills: 3 | Status: SHIPPED | OUTPATIENT
Start: 2021-12-03 | End: 2022-07-11 | Stop reason: ALTCHOICE

## 2022-03-07 ENCOUNTER — OFFICE VISIT (OUTPATIENT)
Dept: CARDIOLOGY CLINIC | Facility: CLINIC | Age: 27
End: 2022-03-07
Payer: COMMERCIAL

## 2022-03-07 VITALS
SYSTOLIC BLOOD PRESSURE: 120 MMHG | DIASTOLIC BLOOD PRESSURE: 84 MMHG | WEIGHT: 234 LBS | HEIGHT: 63 IN | HEART RATE: 67 BPM | BODY MASS INDEX: 41.46 KG/M2

## 2022-03-07 DIAGNOSIS — I47.1 JUNCTIONAL TACHYCARDIA (HCC): Primary | ICD-10-CM

## 2022-03-07 PROBLEM — R53.82 CHRONIC FATIGUE: Status: ACTIVE | Noted: 2022-03-07

## 2022-03-07 PROCEDURE — 93000 ELECTROCARDIOGRAM COMPLETE: CPT | Performed by: INTERNAL MEDICINE

## 2022-03-07 PROCEDURE — 99213 OFFICE O/P EST LOW 20 MIN: CPT | Performed by: INTERNAL MEDICINE

## 2022-03-07 PROCEDURE — 3074F SYST BP LT 130 MM HG: CPT | Performed by: INTERNAL MEDICINE

## 2022-03-07 PROCEDURE — 3008F BODY MASS INDEX DOCD: CPT | Performed by: INTERNAL MEDICINE

## 2022-03-07 PROCEDURE — 3079F DIAST BP 80-89 MM HG: CPT | Performed by: INTERNAL MEDICINE

## 2022-03-07 PROCEDURE — 4004F PT TOBACCO SCREEN RCVD TLK: CPT | Performed by: INTERNAL MEDICINE

## 2022-03-07 NOTE — PATIENT INSTRUCTIONS
For 1 week, try taking flecainide 100 mg twice daily  If that is not beneficial then switch right back to what you were taking  If it is beneficial call me and I will send in a new prescription for the higher dose  As well, if it is beneficial then try cutting back on the metoprolol to twice daily instead of 3 times daily to see if that helps your energy level

## 2022-03-07 NOTE — ASSESSMENT & PLAN NOTE
Maybe  exacerbated by beta-blocker  Will try changing the dose to twice daily particularly if the flecainide dose change is useful

## 2022-03-07 NOTE — PROGRESS NOTES
Patient ID: Lina Nguyễn is a 32 y o  female  Plan:      Junctional tachycardia ([address] )  Weekly breakthroughs  not as profound as prior to flecainide  Better but not perfect on TID dosing  Will try teasing up the dose to 100 mg BID  Chronic fatigue  Maybe  exacerbated by beta-blocker  Will try changing the dose to twice daily particularly if the flecainide dose change is useful  Follow up Plan/Other summary comments: For 1 week, try taking flecainide 100 mg twice daily  If that is not beneficial then switch right back to what you were taking  If it is beneficial call me and I will send in a new prescription for the higher dose  As well, if it is beneficial then try cutting back on the metoprolol to twice daily instead of 3 times daily to see if that helps your energy level  HPI:   Patient is seen in follow-up today regarding the above  Since the last visit she has felt reasonably okay  She feels as if at the end of her dose intervals there is some sense of tachycardia but not severe and she is certainly quite functional   No syncope or near syncope  She also feels as if her medications wear her out a little bit and I pointed out that is more likely the beta-blocker than flecainide  During pregnancy in 2018 patient was found to have significant tachycardia  Ultimately she was told that she had a junctional tachycardia  She was placed on flecainide and metoprolol with reasonably good affect  That is symptoms now are of shorter duration and less frequent  In retrospect she has had intermittent feelings of tachycardia for many years  Results for orders placed or performed in visit on 22   POCT ECG    Impression    NSR  HUNGL  Caleb Antoine Most recent or relevant cardiac/vascular testing:    She describes normal echo in 2018           Past Surgical History:   Procedure Laterality Date    APPENDECTOMY       SECTION       CMP:   Lab Results   Component Value Date    K 4 6 06/19/2021     06/19/2021    CO2 27 06/19/2021    BUN 11 06/19/2021    CREATININE 0 53 (L) 06/19/2021    EGFR 130 06/19/2021       Lipid Profile:   Lab Results   Component Value Date    TRIG 95 06/19/2021    HDL 33 (L) 06/19/2021         Review of Systems   10  point ROS  was otherwise non pertinent or negative except as per HPI or as below  Gait: Normal          Objective:     /84   Pulse 67   Ht 5' 2 5" (1 588 m)   Wt 106 kg (234 lb)   BMI 42 12 kg/m²     PHYSICAL EXAM:    General:  Normal appearance in no distress  Eyes:  Anicteric  Oral mucosa:  Moist   Neck:  No JVD  Carotid upstrokes are brisk without bruits  No masses  Chest:  Clear to auscultation  Cardiac:  No palpable PMI  Normal S1 and S2  No murmur gallop or rub  Abdomen:  Soft and nontender  No palpable organomegaly or aortic enlargement  Extremities:  No peripheral edema  Musculoskeletal:  Symmetric  Vascular:  Femoral pulses are brisk without bruits  Popliteal pulses are intact bilaterally  Pedal pulses are intact  Neuro:  Grossly symmetric  Psych:  Alert and oriented x3          Current Outpatient Medications:     flecainide (TAMBOCOR) 50 mg tablet, Take 1 tablet (50 mg total) by mouth 3 (three) times a day, Disp: 270 tablet, Rfl: 5    metoprolol tartrate (LOPRESSOR) 50 mg tablet, Take 1 tablet (50 mg total) by mouth 3 (three) times a day Take 2 tablets in the morning and 1 tablet at hs , Disp: 90 tablet, Rfl: 3  Allergies   Allergen Reactions    Azithromycin Anaphylaxis    Penicillins Hives    Vicodin [Hydrocodone-Acetaminophen] Rash     Past Medical History:   Diagnosis Date    ADHD     Hypertension     Junctional tachycardia (HCC)            Social History     Tobacco Use   Smoking Status Current Every Day Smoker    Packs/day: 0 25    Years: 8 00    Pack years: 2 00    Types: Cigarettes   Smokeless Tobacco Never Used

## 2022-03-07 NOTE — ASSESSMENT & PLAN NOTE
Weekly breakthroughs  not as profound as prior to flecainide  Better but not perfect on TID dosing  Will try teasing up the dose to 100 mg BID

## 2022-07-11 ENCOUNTER — OFFICE VISIT (OUTPATIENT)
Dept: FAMILY MEDICINE CLINIC | Facility: CLINIC | Age: 27
End: 2022-07-11
Payer: COMMERCIAL

## 2022-07-11 VITALS
TEMPERATURE: 99 F | HEART RATE: 85 BPM | OXYGEN SATURATION: 98 % | WEIGHT: 240 LBS | DIASTOLIC BLOOD PRESSURE: 80 MMHG | BODY MASS INDEX: 42.52 KG/M2 | HEIGHT: 63 IN | SYSTOLIC BLOOD PRESSURE: 122 MMHG

## 2022-07-11 DIAGNOSIS — Z13.29 SCREENING FOR THYROID DISORDER: ICD-10-CM

## 2022-07-11 DIAGNOSIS — Z13.1 SCREENING FOR DIABETES MELLITUS (DM): ICD-10-CM

## 2022-07-11 DIAGNOSIS — Z13.31 DEPRESSION SCREENING NEGATIVE: ICD-10-CM

## 2022-07-11 DIAGNOSIS — I47.1 JUNCTIONAL TACHYCARDIA (HCC): ICD-10-CM

## 2022-07-11 DIAGNOSIS — R53.82 CHRONIC FATIGUE: ICD-10-CM

## 2022-07-11 DIAGNOSIS — Z13.0 SCREENING FOR DEFICIENCY ANEMIA: ICD-10-CM

## 2022-07-11 DIAGNOSIS — Z13.220 SCREENING FOR HYPERLIPIDEMIA: ICD-10-CM

## 2022-07-11 DIAGNOSIS — Z11.59 NEED FOR HEPATITIS C SCREENING TEST: ICD-10-CM

## 2022-07-11 DIAGNOSIS — Z00.00 ANNUAL PHYSICAL EXAM: Primary | ICD-10-CM

## 2022-07-11 DIAGNOSIS — R21 BUTTERFLY RASH: ICD-10-CM

## 2022-07-11 PROBLEM — I10 ESSENTIAL HYPERTENSION: Status: RESOLVED | Noted: 2021-06-21 | Resolved: 2022-07-11

## 2022-07-11 PROCEDURE — 99395 PREV VISIT EST AGE 18-39: CPT | Performed by: NURSE PRACTITIONER

## 2022-07-11 PROCEDURE — 3725F SCREEN DEPRESSION PERFORMED: CPT | Performed by: NURSE PRACTITIONER

## 2022-07-11 NOTE — PROGRESS NOTES
[address] [facility] CARE    NAME: Cheryl Altamirano  AGE: 32 y o  SEX: female  : 1995     DATE: 2022     Assessment and Plan:     Problem List Items Addressed This Visit     Adult BMI 40 0-44 9 kg/sq m (HCC)    Junctional tachycardia (HCC)    Chronic fatigue    Relevant Orders    C-reactive protein    Anti-DNA antibody, double-stranded    UA w Reflex to Microscopic w Reflex to Culture -Lab Collect      Other Visit Diagnoses     Annual physical exam    -  Primary    Relevant Orders    CBC and differential    Lipid Panel with Direct LDL reflex    Comprehensive metabolic panel    TSH, 3rd generation with Free T4 reflex    Screening for deficiency anemia        Relevant Orders    CBC and differential    CBC and differential    Screening for hyperlipidemia        Relevant Orders    Lipid panel    Lipid Panel with Direct LDL reflex    Screening for diabetes mellitus (DM)        Relevant Orders    Comprehensive metabolic panel    Comprehensive metabolic panel    Screening for thyroid disorder        Relevant Orders    TSH, 3rd generation with Free T4 reflex    TSH, 3rd generation with Free T4 reflex    Need for hepatitis C screening test        Relevant Orders    Hepatitis C antibody    Butterfly rash        Relevant Orders    C-reactive protein    Anti-DNA antibody, double-stranded    UA w Reflex to Microscopic w Reflex to Culture -Lab Collect    APTT    Depression screening negative              Immunizations and preventive care screenings were discussed with patient today  Appropriate education was printed on patient's after visit summary  Counseling:  Alcohol/drug use: discussed moderation in alcohol intake, the recommendations for healthy alcohol use, and avoidance of illicit drug use  Dental Health: discussed importance of regular tooth brushing, flossing, and dental visits    Injury prevention: discussed safety/seat belts, safety helmets, smoke detectors, carbon dioxide detectors, and smoking near bedding or upholstery  Sexual health: discussed sexually transmitted diseases, partner selection, use of condoms, avoidance of unintended pregnancy, and contraceptive alternatives  Exercise: the importance of regular exercise/physical activity was discussed  Recommend exercise 3-5 times per week for at least 30 minutes  BMI Counseling: Body mass index is 43 2 kg/m²  The BMI is above normal  Nutrition recommendations include decreasing portion sizes, consuming healthier snacks, limiting drinks that contain sugar, moderation in carbohydrate intake, increasing intake of lean protein, reducing intake of saturated and trans fat and reducing intake of cholesterol  Exercise recommendations include exercising 3-5 times per week  No pharmacotherapy was ordered  Rationale for BMI follow-up plan is due to patient being overweight or obese  Depression Screening and Follow-up Plan: Patient was screened for depression during today's encounter  They screened negative with a PHQ-2 score of 2  Return in about 1 year (around 7/11/2023) for Annual Physical      Chief Complaint:     Chief Complaint   Patient presents with    Annual Exam    Skin Problem     Thinks she has psoriasis and cheeks are always red       History of Present Illness:     Adult Annual Physical   Patient here for a comprehensive physical exam  The patient reports problems - butterfly like rash on face  Diet and Physical Activity  Diet/Nutrition: well balanced diet  Exercise: moderate cardiovascular exercise and 3-4 times a week on average  Depression Screening  PHQ-2/9 Depression Screening    Little interest or pleasure in doing things: 1 - several days  Feeling down, depressed, or hopeless: 1 - several days  PHQ-2 Score: 2  PHQ-2 Interpretation: Negative depression screen       General Health  Sleep: gets 7-8 hours of sleep on average     Hearing: normal - bilateral   Vision: no vision problems and most recent eye exam <1 year ago  Dental: no dental visits for >1 year, brushes teeth twice daily and does not floss  /GYN Health  Last menstrual period: 2022  Contraceptive method: barrier methods  History of STDs?: no     Palpitations  This is a recurrent problem  Associated symptoms include a rash  Treatments tried: BB was d/c by Wyona Fabry MD    Rash  This is a recurrent problem  The current episode started more than 1 year ago  The problem is unchanged  The affected locations include the face  The problem is moderate  The rash is characterized by burning, itchiness, dryness and redness  She was exposed to nothing  Past treatments include moisturizer (sunscreen)  The treatment provided mild relief  There is no history of allergies, asthma, eczema or varicella  There were no sick contacts  Review of Systems:     Review of Systems   Constitutional: Negative  HENT: Negative  Eyes: Negative  Respiratory: Negative  Cardiovascular: Negative  Gastrointestinal: Negative  Endocrine: Negative  Genitourinary: Negative  Musculoskeletal: Negative  Skin: Positive for rash  Allergic/Immunologic: Negative  Neurological: Negative  Hematological: Negative  Psychiatric/Behavioral: Negative         Past Medical History:     Past Medical History:   Diagnosis Date    ADHD     Hypertension     Junctional tachycardia (HCC)       Past Surgical History:     Past Surgical History:   Procedure Laterality Date    APPENDECTOMY       SECTION        Social History:     Social History     Socioeconomic History    Marital status: Single     Spouse name: None    Number of children: None    Years of education: None    Highest education level: None   Occupational History    Occupation: unemployed   Tobacco Use    Smoking status: Current Every Day Smoker     Packs/day: 0 25     Years: 8 00     Pack years: 2 00    Smokeless tobacco: Never Used   Vaping Use    Vaping Use: Every day    Substances: Nicotine, Flavoring   Substance and Sexual Activity    Alcohol use: Not Currently     Comment: Joana    Drug use: Not Currently    Sexual activity: None   Other Topics Concern    None   Social History Narrative    None     Social Determinants of Health     Financial Resource Strain: Not on file   Food Insecurity: Not on file   Transportation Needs: Not on file   Physical Activity: Not on file   Stress: Not on file   Social Connections: Not on file   Intimate Partner Violence: Not on file   Housing Stability: Not on file      Family History:     Family History   Adopted: Yes   Problem Relation Age of Onset    Asthma Mother     Depression Mother     Atrial fibrillation Father     Bipolar disorder Father     Psoriasis Father     Diabetes Sister     Valvular heart disease Brother       Current Medications:     Current Outpatient Medications   Medication Sig Dispense Refill    flecainide (TAMBOCOR) 50 mg tablet Take 1 tablet (50 mg total) by mouth 3 (three) times a day (Patient taking differently: Take 50 mg by mouth 4 (four) times a day) 270 tablet 5     No current facility-administered medications for this visit  Allergies: Allergies   Allergen Reactions    Azithromycin Anaphylaxis    Penicillins Hives    Vicodin [Hydrocodone-Acetaminophen] Rash      Physical Exam:     /80 (BP Location: Left arm, Patient Position: Sitting, Cuff Size: Large)   Pulse 85   Temp 99 °F (37 2 °C) (Tympanic)   Ht 5' 2 5" (1 588 m)   Wt 109 kg (240 lb)   SpO2 98%   BMI 43 20 kg/m²     Physical Exam  Vitals and nursing note reviewed  Constitutional:       Appearance: Normal appearance  She is well-developed  She is obese  HENT:      Head: Normocephalic and atraumatic        Right Ear: Tympanic membrane, ear canal and external ear normal       Left Ear: Tympanic membrane, ear canal and external ear normal       Nose: Nose normal  Mouth/Throat:      Mouth: Mucous membranes are moist    Eyes:      General: Lids are normal  Vision grossly intact  Conjunctiva/sclera: Conjunctivae normal       Pupils: Pupils are equal, round, and reactive to light  Cardiovascular:      Rate and Rhythm: Normal rate and regular rhythm  Pulses: Normal pulses  Heart sounds: Normal heart sounds, S1 normal and S2 normal      No friction rub  No gallop  No S3 sounds  Pulmonary:      Effort: Pulmonary effort is normal       Breath sounds: Normal breath sounds  Abdominal:      General: Bowel sounds are normal       Palpations: Abdomen is soft  Tenderness: There is no abdominal tenderness  Genitourinary:     Comments: Deferred  Musculoskeletal:         General: Normal range of motion  Cervical back: Normal range of motion and neck supple  Right lower leg: No edema  Left lower leg: No edema  Lymphadenopathy:      Cervical: No cervical adenopathy  Skin:     General: Skin is warm and dry  Capillary Refill: Capillary refill takes less than 2 seconds  Neurological:      General: No focal deficit present  Mental Status: She is alert and oriented to person, place, and time  Motor: Motor function is intact  Gait: Gait is intact  Psychiatric:         Attention and Perception: Attention and perception normal          Mood and Affect: Mood and affect normal          Speech: Speech normal          Behavior: Behavior normal  Behavior is cooperative  Thought Content:  Thought content normal          Cognition and Memory: Cognition and memory normal          Judgment: Judgment normal           DEREK Lou    [id number] [facility]

## 2022-07-11 NOTE — PATIENT INSTRUCTIONS
Wellness Visit for Adults   AMBULATORY CARE:   A wellness visit  is when you see your healthcare provider to get screened for health problems  Your healthcare provider will also give you advice on how to stay healthy  Write down your questions so you remember to ask them  Ask your healthcare provider how often you should have a wellness visit  What happens at a wellness visit:  Your healthcare provider will ask about your health, and your family history of health problems  This includes high blood pressure, heart disease, and cancer  He or she will ask if you have symptoms that concern you, if you smoke, and about your mood  You may also be asked about your intake of medicines, supplements, food, and alcohol  Any of the following may be done:  · Your weight  will be checked  Your height may also be checked so your body mass index (BMI) can be calculated  Your BMI shows if you are at a healthy weight  · Your blood pressure  and heart rate will be checked  Your temperature may also be checked  · Blood and urine tests  may be done  Blood tests may be done to check your cholesterol levels  Abnormal cholesterol levels increase your risk for heart disease and stroke  You may also need a blood or urine test to check for diabetes if you are at increased risk  Urine tests may be done to look for signs of an infection or kidney disease  · A physical exam  includes checking your heartbeat and lungs with a stethoscope  Your healthcare provider may also check your skin to look for sun damage  · Screening tests  may be recommended  A screening test is done to check for diseases that may not cause symptoms  The screening tests you may need depend on your age, gender, family history, and lifestyle habits  For example, colorectal screening may be recommended if you are 48years old or older  Screening tests you need if you are a woman:   · A Pap smear  is used to screen for cervical cancer   Pap smears are usually done every 3 to 5 years depending on your age  You may need them more often if you have had abnormal Pap smear test results in the past  Ask your healthcare provider how often you should have a Pap smear  · A mammogram  is an x-ray of your breasts to screen for breast cancer  Experts recommend mammograms every 2 years starting at age 48 years  You may need a mammogram at age 52 years or younger if you have an increased risk for breast cancer  Talk to your healthcare provider about when you should start having mammograms and how often you need them  Vaccines you may need:   · Get an influenza vaccine  every year  The influenza vaccine protects you from the flu  Several types of viruses cause the flu  The viruses change over time, so new vaccines are made each year  · Get a tetanus-diphtheria (Td) booster vaccine  every 10 years  This vaccine protects you against tetanus and diphtheria  Tetanus is a severe infection that may cause painful muscle spasms and lockjaw  Diphtheria is a severe bacterial infection that causes a thick covering in the back of your mouth and throat  · Get a human papillomavirus (HPV) vaccine  if you are female and aged 23 to 32 or male 23 to 24 and never received it  This vaccine protects you from HPV infection  HPV is the most common infection spread by sexual contact  HPV may also cause vaginal, penile, and anal cancers  · Get a pneumococcal vaccine  if you are aged 72 years or older  The pneumococcal vaccine is an injection given to protect you from pneumococcal disease  Pneumococcal disease is an infection caused by pneumococcal bacteria  The infection may cause pneumonia, meningitis, or an ear infection  · Get a shingles vaccine  if you are 60 or older, even if you have had shingles before  The shingles vaccine is an injection to protect you from the varicella-zoster virus  This is the same virus that causes chickenpox   Shingles is a painful rash that develops in people who had chickenpox or have been exposed to the virus  How to eat healthy:  My Plate is a model for planning healthy meals  It shows the types and amounts of foods that should go on your plate  Fruits and vegetables make up about half of your plate, and grains and protein make up the other half  A serving of dairy is included on the side of your plate  The amount of calories and serving sizes you need depends on your age, gender, weight, and height  Examples of healthy foods are listed below:  · Eat a variety of vegetables  such as dark green, red, and orange vegetables  You can also include canned vegetables low in sodium (salt) and frozen vegetables without added butter or sauces  · Eat a variety of fresh fruits , canned fruit in 100% juice, frozen fruit, and dried fruit  · Include whole grains  At least half of the grains you eat should be whole grains  Examples include whole-wheat bread, wheat pasta, brown rice, and whole-grain cereals such as oatmeal     · Eat a variety of protein foods such as seafood (fish and shellfish), lean meat, and poultry without skin (turkey and chicken)  Examples of lean meats include pork leg, shoulder, or tenderloin, and beef round, sirloin, tenderloin, and extra lean ground beef  Other protein foods include eggs and egg substitutes, beans, peas, soy products, nuts, and seeds  · Choose low-fat dairy products such as skim or 1% milk or low-fat yogurt, cheese, and cottage cheese  · Limit unhealthy fats  such as butter, hard margarine, and shortening  Exercise:  Exercise at least 30 minutes per day on most days of the week  Some examples of exercise include walking, biking, dancing, and swimming  You can also fit in more physical activity by taking the stairs instead of the elevator or parking farther away from stores  Include muscle strengthening activities 2 days each week  Regular exercise provides many health benefits   It helps you manage your weight, and decreases your risk for type 2 diabetes, heart disease, stroke, and high blood pressure  Exercise can also help improve your mood  Ask your healthcare provider about the best exercise plan for you  General health and safety guidelines:   · Do not smoke  Nicotine and other chemicals in cigarettes and cigars can cause lung damage  Ask your healthcare provider for information if you currently smoke and need help to quit  E-cigarettes or smokeless tobacco still contain nicotine  Talk to your healthcare provider before you use these products  · Limit alcohol  A drink of alcohol is 12 ounces of beer, 5 ounces of wine, or 1½ ounces of liquor  · Lose weight, if needed  Being overweight increases your risk of certain health conditions  These include heart disease, high blood pressure, type 2 diabetes, and certain types of cancer  · Protect your skin  Do not sunbathe or use tanning beds  Use sunscreen with a SPF 15 or higher  Apply sunscreen at least 15 minutes before you go outside  Reapply sunscreen every 2 hours  Wear protective clothing, hats, and sunglasses when you are outside  · Drive safely  Always wear your seatbelt  Make sure everyone in your car wears a seatbelt  A seatbelt can save your life if you are in an accident  Do not use your cell phone when you are driving  This could distract you and cause an accident  Pull over if you need to make a call or send a text message  · Practice safe sex  Use latex condoms if are sexually active and have more than one partner  Your healthcare provider may recommend screening tests for sexually transmitted infections (STIs)  · Wear helmets, lifejackets, and protective gear  Always wear a helmet when you ride a bike or motorcycle, go skiing, or play sports that could cause a head injury  Wear protective equipment when you play sports  Wear a lifejacket when you are on a boat or doing water sports      © Copyright [employer] 2022 Information is for End User's use only and may not be sold, redistributed or otherwise used for commercial purposes  All illustrations and images included in CareNotes® are the copyrighted property of [employer] A M [employer]  or Krystin Miguel  The above information is an  only  It is not intended as medical advice for individual conditions or treatments  Talk to your doctor, nurse or pharmacist before following any medical regimen to see if it is safe and effective for you  Weight Management   AMBULATORY CARE:   Why it is important to manage your weight:  Being overweight increases your risk of health conditions such as heart disease, high blood pressure, type 2 diabetes, and certain types of cancer  It can also increase your risk for osteoarthritis, sleep apnea, and other respiratory problems  Aim for a slow, steady weight loss  Even a small amount of weight loss can lower your risk of health problems  Risks of being overweight:  Extra weight can cause many health problems, including the following:  · Diabetes (high blood sugar level)    · High blood pressure or high cholesterol    · Heart disease    · Stroke    · Gallbladder or liver disease    · Cancer of the colon, breast, prostate, liver, or kidney    · Sleep apnea    · Arthritis or gout    Screening  is done to check for health conditions before you have signs or symptoms  If you are 28to 79years old, your blood sugar level may be checked every 3 years for signs of prediabetes or diabetes  Your healthcare provider will check your blood pressure at each visit  High blood pressure can lead to a stroke or other problems  Your provider may check for signs of heart disease, cancer, or other health problems  How to lose weight safely:  A safe and healthy way to lose weight is to eat fewer calories and get regular exercise  · You can lose up about 1 pound a week by decreasing the number of calories you eat by 500 calories each day   You can decrease calories by eating smaller portion sizes or by cutting out high-calorie foods  Read labels to find out how many calories are in the foods you eat  · You can also burn calories with exercise such as walking, swimming, or biking  You will be more likely to keep weight off if you make these changes part of your lifestyle  Exercise at least 30 minutes per day on most days of the week  You can also fit in more physical activity by taking the stairs instead of the elevator or parking farther away from stores  Ask your healthcare provider about the best exercise plan for you  Healthy meal plan for weight management:  A healthy meal plan includes a variety of foods, contains fewer calories, and helps you stay healthy  A healthy meal plan includes the following:     · Eat whole-grain foods more often  A healthy meal plan should contain fiber  Fiber is the part of grains, fruits, and vegetables that is not broken down by your body  Whole-grain foods are healthy and provide extra fiber in your diet  Some examples of whole-grain foods are whole-wheat breads and pastas, oatmeal, brown rice, and bulgur  · Eat a variety of vegetables every day  Include dark, leafy greens such as spinach, kale, geovanni greens, and mustard greens  Eat yellow and orange vegetables such as carrots, sweet potatoes, and winter squash  · Eat a variety of fruits every day  Choose fresh or canned fruit (canned in its own juice or light syrup) instead of juice  Fruit juice has very little or no fiber  · Eat low-fat dairy foods  Drink fat-free (skim) milk or 1% milk  Eat fat-free yogurt and low-fat cottage cheese  Try low-fat cheeses such as mozzarella and other reduced-fat cheeses  · Choose meat and other protein foods that are low in fat  Choose beans or other legumes such as split peas or lentils  Choose fish, skinless poultry (chicken or turkey), or lean cuts of red meat (beef or pork)   Before you cook meat or poultry, cut off any visible fat  · Use less fat and oil  Try baking foods instead of frying them  Add less fat, such as margarine, sour cream, regular salad dressing and mayonnaise to foods  Eat fewer high-fat foods  Some examples of high-fat foods include french fries, doughnuts, ice cream, and cakes  · Eat fewer sweets  Limit foods and drinks that are high in sugar  This includes candy, cookies, regular soda, and sweetened drinks  Ways to decrease calories:   · Eat smaller portions  ? Use a small plate with smaller servings  ? Do not eat second helpings  ? When you eat at a restaurant, ask for a box and place half of your meal in the box before you eat  ? Share an entrée with someone else  · Replace high-calorie snacks with healthy, low-calorie snacks  ? Choose fresh fruit, vegetables, fat-free rice cakes, or air-popped popcorn instead of potato chips, nuts, or chocolate  ? Choose water or calorie-free drinks instead of soda or sweetened drinks  · Do not shop for groceries when you are hungry  You may be more likely to make unhealthy food choices  Take a grocery list of healthy foods and shop after you have eaten  · Eat regular meals  Do not skip meals  Skipping meals can lead to overeating later in the day  This can make it harder for you to lose weight  Eat a healthy snack in place of a meal if you do not have time to eat a regular meal  Talk with a dietitian to help you create a meal plan and schedule that is right for you  Other things to consider as you try to lose weight:   · Be aware of situations that may give you the urge to overeat, such as eating while watching television  Find ways to avoid these situations  For example, read a book, go for a walk, or do crafts  · Meet with a weight loss support group or friends who are also trying to lose weight  This may help you stay motivated to continue working on your weight loss goals      © Copyright [employer] 2022 Information is for End User's use only and may not be sold, redistributed or otherwise used for commercial purposes  All illustrations and images included in CareNotes® are the copyrighted property of A D A M [employer] Inc  or Krystin Miguel  The above information is an  only  It is not intended as medical advice for individual conditions or treatments  Talk to your doctor, nurse or pharmacist before following any medical regimen to see if it is safe and effective for you  Obesity   AMBULATORY CARE:   Obesity  means your body mass index (BMI) is greater than 30  Your healthcare provider will use your height and weight to measure your BMI  The risks of obesity include  many health problems, including injuries or physical disability  · Diabetes (high blood sugar level)    · High blood pressure or high cholesterol    · Heart disease    · Stroke    · Gallbladder or liver disease    · Cancer of the colon, breast, prostate, liver, or kidney    · Sleep apnea    · Arthritis or gout    Screening  is done to check for health conditions before you have signs or symptoms  If you are 28to 79years old, your blood sugar level may be checked every 3 years for signs of prediabetes or diabetes  Your healthcare provider will check your blood pressure at each visit  High blood pressure can lead to a stroke or other problems  Your provider may check for signs of heart disease, cancer, or other health problems  Seek care immediately if:   · You have a severe headache, confusion, or difficulty speaking  · You have weakness on one side of your body  · You have chest pain, sweating, or shortness of breath  Call your doctor if:   · You have symptoms of gallbladder or liver disease, such as pain in your upper abdomen  · You have knee or hip pain and discomfort while walking  · You have symptoms of diabetes, such as intense hunger and thirst, and frequent urination      · You have symptoms of sleep apnea, such as snoring or daytime sleepiness  · You have questions or concerns about your condition or care  Treatment for obesity  focuses on helping you lose weight to improve your health  Even a small decrease in BMI can reduce the risk for many health problems  Your healthcare provider will help you set a weight-loss goal   · Lifestyle changes  are the first step in treating obesity  These include making healthy food choices and getting regular physical activity  Your healthcare provider may suggest a weight-loss program that involves coaching, education, and therapy  · Medicine  may help you lose weight when it is used with a healthy foods and physical activity  · Surgery  can help you lose weight if you are very obese and have other health problems  There are several types of weight-loss surgery  Ask your healthcare provider for more information  Tips for safe weight loss:   · Set small, realistic goals  An example of a small goal is to walk for 20 minutes 5 days a week  Anther goal is to lose 5% of your body weight  · Tell friends, family members, and coworkers about your goals  and ask for their support  Ask a friend to lose weight with you, or join a weight-loss support group  · Identify foods or triggers that may cause you to overeat , and find ways to avoid them  Remove tempting high-calorie foods from your home and workplace  Place a bowl of fresh fruit on your kitchen counter  If stress causes you to eat, then find other ways to cope with stress  A counselor or therapist may be able to help you  · Keep a diary to track what you eat and drink  Also write down how many minutes of physical activity you do each day  Weigh yourself once a week and record it in your diary  Eating changes: You will need to eat 500 to 1,000 fewer calories each day than you currently eat to lose 1 to 2 pounds a week  The following changes will help you cut calories:  · Eat smaller portions    Use small plates, no larger than 9 inches in diameter  Fill your plate half full of fruits and vegetables  Measure your food using measuring cups until you know what a serving size looks like  · Eat 3 meals and 1 or 2 snacks each day  Plan your meals in advance  [employer] and eat at home most of the time  Eat slowly  Do not skip meals  Skipping meals can lead to overeating later in the day  This can make it harder for you to lose weight  Talk with a dietitian to help you make a meal plan and schedule that is right for you  · Eat fruits and vegetables at every meal   They are low in calories and high in fiber, which makes you feel full  Do not add butter, margarine, or cream sauce to vegetables  Use herbs to season steamed vegetables  · Eat less fat and fewer fried foods  Eat more baked or grilled chicken and fish  These protein sources are lower in calories and fat than red meat  Limit fast food  Dress your salads with olive oil and vinegar instead of bottled dressing  · Limit the amount of sugar you eat  Do not drink sugary beverages  Limit alcohol  Activity changes:  Physical activity is good for your body in many ways  It helps you burn calories and build strong muscles  It decreases stress and depression, and improves your mood  It can also help you sleep better  Talk to your healthcare provider before you begin an exercise program   · Exercise for at least 30 minutes 5 days a week  Start slowly  Set aside time each day for physical activity that you enjoy and that is convenient for you  It is best to do both weight training and an activity that increases your heart rate, such as walking, bicycling, or swimming  · Find ways to be more active  Do yard work and housecleaning  Walk up the stairs instead of using elevators  Spend your leisure time going to events that require walking, such as outdoor festivals or fairs  This extra physical activity can help you lose weight and keep it off         Follow up with your doctor as directed: You may need to meet with a dietitian  Write down your questions so you remember to ask them during your visits  © Copyright [employer] 2022 Information is for End User's use only and may not be sold, redistributed or otherwise used for commercial purposes  All illustrations and images included in CareNotes® are the copyrighted property of [employer]  or Krystin Omer   The above information is an  only  It is not intended as medical advice for individual conditions or treatments  Talk to your doctor, nurse or pharmacist before following any medical regimen to see if it is safe and effective for you  Cholesterol and Your Health   AMBULATORY CARE:   Cholesterol  is a waxy, fat-like substance  Your body uses cholesterol to make hormones and new cells, and to protect nerves  Cholesterol is made by your body  It also comes from certain foods you eat, such as meat and dairy products  Your healthcare provider can help you set goals for your cholesterol levels  He or she can help you create a plan to meet your goals  Cholesterol level goals: Your cholesterol level goals depend on your risk for heart disease, your age, and your other health conditions  The following are general guidelines:  · Total cholesterol  includes low-density lipoprotein (LDL), high-density lipoprotein (HDL), and triglyceride levels  The total cholesterol level should be lower than 200 mg/dL and is best at about 150 mg/dL  · LDL cholesterol  is called bad cholesterol  because it forms plaque in your arteries  As plaque builds up, your arteries become narrow, and less blood flows through  When plaque decreases blood flow to your heart, you may have chest pain  If plaque completely blocks an artery that brings blood to your heart, you may have a heart attack  Plaque can break off and form blood clots  Blood clots may block arteries in your brain and cause a stroke   The level should be less than 130 mg/dL and is best at about 100 mg/dL  · HDL cholesterol  is called good cholesterol  because it helps remove LDL cholesterol from your arteries  It does this by attaching to LDL cholesterol and carrying it to your liver  Your liver breaks down LDL cholesterol so your body can get rid of it  High levels of HDL cholesterol can help prevent a heart attack and stroke  Low levels of HDL cholesterol can increase your risk for heart disease, heart attack, and stroke  The level should be 60 mg/dL or higher  · Triglycerides  are a type of fat that store energy from foods you eat  High levels of triglycerides also cause plaque buildup  This can increase your risk for a heart attack or stroke  If your triglyceride level is high, your LDL cholesterol level may also be high  The level should be less than 150 mg/dL  Any of the following can increase your risk for high cholesterol:   · Smoking cigarettes    · Being overweight or obese, or not getting enough exercise    · Drinking large amounts of alcohol    · A medical condition such as hypertension (high blood pressure) or diabetes    · Certain genes passed from your parents to you    · Age older than 65 years    What you need to know about having your cholesterol levels checked: Adults 21to 39years of age should have their cholesterol levels checked every 4 to 6 years  Adults 45 years or older should have their cholesterol checked every 1 to 2 years  You may need your cholesterol checked more often, or at a younger age, if you have risk factors for heart disease  You may also need to have your cholesterol checked more often if you have other health conditions, such as diabetes  Blood tests are used to check cholesterol levels  Blood tests measure your levels of triglycerides, LDL cholesterol, and HDL cholesterol  How healthy fats affect your cholesterol levels:  Healthy fats, also called unsaturated fats, help lower LDL cholesterol and triglyceride levels  Healthy fats include the following:  · Monounsaturated fats  are found in foods such as olive oil, canola oil, avocado, nuts, and olives  · Polyunsaturated fats,  such as omega 3 fats, are found in fish, such as salmon, trout, and tuna  They can also be found in plant foods such as flaxseed, walnuts, and soybeans  How unhealthy fats affect your cholesterol levels:  Unhealthy fats increase LDL cholesterol and triglyceride levels  They are found in foods high in cholesterol, saturated fat, and trans fat:  · Cholesterol  is found in eggs, dairy, and meat  · Saturated fat  is found in butter, cheese, ice cream, whole milk, and coconut oil  Saturated fat is also found in meat, such as sausage, hot dogs, and bologna  · Trans fat  is found in liquid oils and is used in fried and baked foods  Foods that contain trans fats include chips, crackers, muffins, sweet rolls, microwave popcorn, and cookies  Treatment  for high cholesterol will also decrease your risk of heart disease, heart attack, and stroke  Treatment may include any of the following:  · Lifestyle changes  may include food, exercise, weight loss, and quitting smoking  You may also need to decrease the amount of alcohol you drink  Your healthcare provider will want you to start with lifestyle changes  Other treatment may be added if lifestyle changes are not enough  Your healthcare provider may recommend you work with a team to manage hyperlipidemia  The team may include medical experts such as a dietitian, an exercise or physical therapist, and a behavior therapist  Your family members may be included in helping you create lifestyle changes  · Medicines  may be given to lower your LDL cholesterol, triglyceride levels, or total cholesterol level  You may need medicines to lower your cholesterol if any of the following is true:    ? You have a history of stroke, TIA, unstable angina, or a heart attack  ?  Your LDL cholesterol level is 190 mg/dL or higher  ? You are age 36 to 76 years, have diabetes or heart disease risk factors, and your LDL cholesterol is 70 mg/dL or higher  · Supplements  include fish oil, red yeast rice, and garlic  Fish oil may help lower your triglyceride and LDL cholesterol levels  It may also increase your HDL cholesterol level  Red yeast rice may help decrease your total cholesterol level and LDL cholesterol level  Garlic may help lower your total cholesterol level  Do not take any supplements without talking to your healthcare provider  Food changes you can make to lower your cholesterol levels:  A dietitian can help you create a healthy eating plan  He or she can show you how to read food labels and choose foods low in saturated fat, trans fats, and cholesterol  · Decrease the total amount of fat you eat  Choose lean meats, fat-free or 1% fat milk, and low-fat dairy products, such as yogurt and cheese  Try to limit or avoid red meats  Limit or do not eat fried foods or baked goods, such as cookies  · Replace unhealthy fats with healthy fats  Cook foods in olive oil or canola oil  Choose soft margarines that are low in saturated fat and trans fat  Seeds, nuts, and avocados are other examples of healthy fats  · Eat foods with omega-3 fats  Examples include salmon, tuna, mackerel, walnuts, and flaxseed  Eat fish 2 times per week  Pregnant women should not eat fish that have high levels of mercury, such as shark, swordfish, and rome mackerel  · Increase the amount of high-fiber foods you eat  High-fiber foods can help lower your LDL cholesterol  Aim to get between 20 and 30 grams of fiber each day  Fruits and vegetables are high in fiber  Eat at least 5 servings each day  Other high-fiber foods are whole-grain or whole-wheat breads, pastas, or cereals, and brown rice  Eat 3 ounces of whole-grain foods each day  Increase fiber slowly   You may have abdominal discomfort, bloating, and gas if you add fiber to your diet too quickly  · Eat healthy protein foods  Examples include low-fat dairy products, skinless chicken and turkey, fish, and nuts  · Limit foods and drinks that are high in sugar  Your dietitian or healthcare provider can help you create daily limits for high-sugar foods and drinks  The limit may be lower if you have diabetes or another health condition  Limits can also help you lose weight if needed  Lifestyle changes you can make to lower your cholesterol levels:   · Maintain a healthy weight  Ask your healthcare provider what a healthy weight is for you  Ask him or her to help you create a weight loss plan if needed  Weight loss can decrease your total cholesterol and triglyceride levels  Weight loss may also help keep your blood pressure at a healthy level  · Be physically active throughout the day  Physical activity, such as exercise, can help lower your total cholesterol level and maintain a healthy weight  Physical activity can also help increase your HDL cholesterol level  Work with your healthcare provider to create an program that is right for you  Get at least 30 to 40 minutes of moderate physical activity most days of the week  Examples of exercise include brisk walking, swimming, or biking  Also include strength training at least 2 times each week  Your healthcare providers can help you create a physical activity plan  · Do not smoke  Nicotine and other chemicals in cigarettes and cigars can raise your cholesterol levels  Ask your healthcare provider for information if you currently smoke and need help to quit  E-cigarettes or smokeless tobacco still contain nicotine  Talk to your healthcare provider before you use these products  · Limit or do not drink alcohol  Alcohol can increase your triglyceride levels  Ask your healthcare provider before you drink alcohol  Ask how much is okay for you to drink in 24 hours or 1 week      Follow up with your doctor as directed:  Write down your questions so you remember to ask them during your visits  © Copyright "[employer] 2022 Information is for End User's use only and may not be sold, redistributed or otherwise used for commercial purposes  All illustrations and images included in CareNotes® are the copyrighted property of [employer]  or Krystin Miguel  The above information is an  only  It is not intended as medical advice for individual conditions or treatments  Talk to your doctor, nurse or pharmacist before following any medical regimen to see if it is safe and effective for you

## 2022-07-19 ENCOUNTER — APPOINTMENT (OUTPATIENT)
Dept: LAB | Facility: CLINIC | Age: 27
End: 2022-07-19
Payer: COMMERCIAL

## 2022-07-19 DIAGNOSIS — Z13.1 SCREENING FOR DIABETES MELLITUS (DM): ICD-10-CM

## 2022-07-19 DIAGNOSIS — Z13.0 SCREENING FOR DEFICIENCY ANEMIA: ICD-10-CM

## 2022-07-19 DIAGNOSIS — Z13.29 SCREENING FOR THYROID DISORDER: ICD-10-CM

## 2022-07-19 DIAGNOSIS — R53.82 CHRONIC FATIGUE: ICD-10-CM

## 2022-07-19 DIAGNOSIS — R21 BUTTERFLY RASH: ICD-10-CM

## 2022-07-19 DIAGNOSIS — Z13.220 SCREENING FOR HYPERLIPIDEMIA: ICD-10-CM

## 2022-07-19 DIAGNOSIS — Z11.59 NEED FOR HEPATITIS C SCREENING TEST: ICD-10-CM

## 2022-07-19 LAB
ALBUMIN SERPL BCP-MCNC: 3.8 G/DL (ref 3.5–5)
ALP SERPL-CCNC: 95 U/L (ref 46–116)
ALT SERPL W P-5'-P-CCNC: 25 U/L (ref 12–78)
ANION GAP SERPL CALCULATED.3IONS-SCNC: 4 MMOL/L (ref 4–13)
APTT PPP: 23 SECONDS (ref 23–37)
AST SERPL W P-5'-P-CCNC: 19 U/L (ref 5–45)
BASOPHILS # BLD AUTO: 0.04 THOUSANDS/ΜL (ref 0–0.1)
BASOPHILS NFR BLD AUTO: 1 % (ref 0–1)
BILIRUB SERPL-MCNC: 0.55 MG/DL (ref 0.2–1)
BILIRUB UR QL STRIP: NEGATIVE
BUN SERPL-MCNC: 9 MG/DL (ref 5–25)
CALCIUM SERPL-MCNC: 9.2 MG/DL (ref 8.3–10.1)
CHLORIDE SERPL-SCNC: 107 MMOL/L (ref 96–108)
CHOLEST SERPL-MCNC: 169 MG/DL
CLARITY UR: NORMAL
CO2 SERPL-SCNC: 27 MMOL/L (ref 21–32)
COLOR UR: NORMAL
CREAT SERPL-MCNC: 0.68 MG/DL (ref 0.6–1.3)
CRP SERPL QL: 8.1 MG/L
EOSINOPHIL # BLD AUTO: 0.16 THOUSAND/ΜL (ref 0–0.61)
EOSINOPHIL NFR BLD AUTO: 3 % (ref 0–6)
ERYTHROCYTE [DISTWIDTH] IN BLOOD BY AUTOMATED COUNT: 12.7 % (ref 11.6–15.1)
GFR SERPL CREATININE-BSD FRML MDRD: 118 ML/MIN/1.73SQ M
GLUCOSE P FAST SERPL-MCNC: 90 MG/DL (ref 65–99)
GLUCOSE UR STRIP-MCNC: NEGATIVE MG/DL
HCT VFR BLD AUTO: 41.5 % (ref 34.8–46.1)
HDLC SERPL-MCNC: 36 MG/DL
HGB BLD-MCNC: 13.5 G/DL (ref 11.5–15.4)
HGB UR QL STRIP.AUTO: NEGATIVE
IMM GRANULOCYTES # BLD AUTO: 0.01 THOUSAND/UL (ref 0–0.2)
IMM GRANULOCYTES NFR BLD AUTO: 0 % (ref 0–2)
KETONES UR STRIP-MCNC: NEGATIVE MG/DL
LDLC SERPL CALC-MCNC: 114 MG/DL (ref 0–100)
LEUKOCYTE ESTERASE UR QL STRIP: NEGATIVE
LYMPHOCYTES # BLD AUTO: 1.62 THOUSANDS/ΜL (ref 0.6–4.47)
LYMPHOCYTES NFR BLD AUTO: 27 % (ref 14–44)
MCH RBC QN AUTO: 29.7 PG (ref 26.8–34.3)
MCHC RBC AUTO-ENTMCNC: 32.5 G/DL (ref 31.4–37.4)
MCV RBC AUTO: 91 FL (ref 82–98)
MONOCYTES # BLD AUTO: 0.73 THOUSAND/ΜL (ref 0.17–1.22)
MONOCYTES NFR BLD AUTO: 12 % (ref 4–12)
NEUTROPHILS # BLD AUTO: 3.54 THOUSANDS/ΜL (ref 1.85–7.62)
NEUTS SEG NFR BLD AUTO: 57 % (ref 43–75)
NITRITE UR QL STRIP: NEGATIVE
NONHDLC SERPL-MCNC: 133 MG/DL
NRBC BLD AUTO-RTO: 0 /100 WBCS
PH UR STRIP.AUTO: 6 [PH]
PLATELET # BLD AUTO: 400 THOUSANDS/UL (ref 149–390)
PMV BLD AUTO: 9.3 FL (ref 8.9–12.7)
POTASSIUM SERPL-SCNC: 4.4 MMOL/L (ref 3.5–5.3)
PROT SERPL-MCNC: 8.1 G/DL (ref 6.4–8.4)
PROT UR STRIP-MCNC: NEGATIVE MG/DL
RBC # BLD AUTO: 4.55 MILLION/UL (ref 3.81–5.12)
SODIUM SERPL-SCNC: 138 MMOL/L (ref 135–147)
SP GR UR STRIP.AUTO: 1.01 (ref 1–1.03)
TRIGL SERPL-MCNC: 94 MG/DL
TSH SERPL DL<=0.05 MIU/L-ACNC: 1.39 UIU/ML (ref 0.45–4.5)
UROBILINOGEN UR STRIP-ACNC: <2 MG/DL
WBC # BLD AUTO: 6.1 THOUSAND/UL (ref 4.31–10.16)

## 2022-07-19 PROCEDURE — 86225 DNA ANTIBODY NATIVE: CPT

## 2022-07-19 PROCEDURE — 81003 URINALYSIS AUTO W/O SCOPE: CPT

## 2022-07-19 PROCEDURE — 85730 THROMBOPLASTIN TIME PARTIAL: CPT

## 2022-07-19 PROCEDURE — 80061 LIPID PANEL: CPT

## 2022-07-19 PROCEDURE — 80053 COMPREHEN METABOLIC PANEL: CPT

## 2022-07-19 PROCEDURE — 84443 ASSAY THYROID STIM HORMONE: CPT

## 2022-07-19 PROCEDURE — 86803 HEPATITIS C AB TEST: CPT

## 2022-07-19 PROCEDURE — 36415 COLL VENOUS BLD VENIPUNCTURE: CPT

## 2022-07-19 PROCEDURE — 86140 C-REACTIVE PROTEIN: CPT

## 2022-07-19 PROCEDURE — 85025 COMPLETE CBC W/AUTO DIFF WBC: CPT

## 2022-07-20 LAB
DSDNA AB SER-ACNC: 1 IU/ML (ref 0–9)
HCV AB SER QL: NORMAL

## 2022-07-21 ENCOUNTER — PATIENT MESSAGE (OUTPATIENT)
Dept: FAMILY MEDICINE CLINIC | Facility: CLINIC | Age: 27
End: 2022-07-21

## 2022-07-26 ENCOUNTER — VBI (OUTPATIENT)
Dept: ADMINISTRATIVE | Facility: OTHER | Age: 27
End: 2022-07-26

## 2022-09-12 ENCOUNTER — HOSPITAL ENCOUNTER (EMERGENCY)
Facility: HOSPITAL | Age: 27
Discharge: HOME | End: 2022-09-12
Attending: EMERGENCY MEDICINE
Payer: COMMERCIAL

## 2022-09-12 VITALS
SYSTOLIC BLOOD PRESSURE: 118 MMHG | OXYGEN SATURATION: 99 % | RESPIRATION RATE: 16 BRPM | DIASTOLIC BLOOD PRESSURE: 74 MMHG | TEMPERATURE: 98.8 F | HEART RATE: 81 BPM
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
## 2022-09-12 PROCEDURE — 99284 EMERGENCY DEPT VISIT MOD MDM: CPT

## 2022-09-12 PROCEDURE — 99282 EMERGENCY DEPT VISIT SF MDM: CPT

## 2022-09-12 RX ORDER — MOXIFLOXACIN HYDROCHLORIDE 400 MG/1
400 TABLET ORAL DAILY
Qty: 7 TABLET | Refills: 0 | Status: SHIPPED | OUTPATIENT
Start: 2022-09-12 | End: 2022-09-19

## 2022-09-12 NOTE — ED PROVIDER NOTES
History  Chief Complaint   Patient presents with    Cat Bite     Pt reports she was bit by her new cat and is not sure if the cat is up to date on vaccines  Patient is a 32year old female coming in after being bitten by a new cat  Patient states she is unaware of the vaccination status, because she recently got them from a woman in [place]  Attack was provoked, after she picked cat up, cat's broken nail become in her sweater, cat yelped and bit her  Patient states last TDAP was in 2018, during her pregnancy 4 years ago  I offered patient rabies immunoglobin and vaccines, but did state there is only a small risk as the cats haven been acting normally, and they are indoor cats  Normal range of motion  2 small puncture wounds noted on right index finger  Bleeding has stopped at this time      History provided by:  Patient   used: No    Cat Bite  Contact animal:  Cat  Location:  Finger  Finger injury location:  R index finger  Time since incident:  1 hour  Incident location:  Home  Provoked: provoked    Notifications:  None  Animal's rabies vaccination status:  Unknown  Animal in possession: yes    Tetanus status:  Up to date (2018 per chart review)  Associated symptoms: no fever, no numbness, no rash and no swelling        Prior to Admission Medications   Prescriptions Last Dose Informant Patient Reported?  Taking?   flecainide (TAMBOCOR) 50 mg tablet   No No   Sig: Take 1 tablet (50 mg total) by mouth 3 (three) times a day   Patient taking differently: Take 50 mg by mouth 4 (four) times a day      Facility-Administered Medications: None       Past Medical History:   Diagnosis Date    ADHD     Hypertension     Junctional tachycardia ([address] )        Past Surgical History:   Procedure Laterality Date    APPENDECTOMY       SECTION         Family History   Adopted: Yes   Problem Relation Age of Onset    Asthma Mother     Depression Mother     Atrial fibrillation Father     Bipolar disorder Father     Psoriasis Father     Diabetes Sister     Valvular heart disease Brother      I have reviewed and agree with the history as documented  E-Cigarette/Vaping    E-Cigarette Use Current Every Day User      E-Cigarette/Vaping Substances    Nicotine Yes     THC No     CBD No     Flavoring Yes     Other No     Unknown No      Social History     Tobacco Use    Smoking status: Current Every Day Smoker     Packs/day: 0 25     Years: 8 00     Pack years: 2 00    Smokeless tobacco: Never Used   Vaping Use    Vaping Use: Every day    Substances: Nicotine, Flavoring   Substance Use Topics    Alcohol use: Not Currently     Comment: Joana    Drug use: Not Currently       Review of Systems   Constitutional: Negative  Negative for fever  HENT: Negative  Eyes: Negative  Respiratory: Negative  Cardiovascular: Negative  Gastrointestinal: Negative  Genitourinary: Negative  Musculoskeletal: Negative  Skin: Positive for wound  Negative for rash  Neurological: Negative  Negative for numbness  Psychiatric/Behavioral: Negative  Physical Exam  Physical Exam  Vitals reviewed  Constitutional:       Appearance: Normal appearance  She is normal weight  HENT:      Head: Normocephalic and atraumatic  Right Ear: External ear normal       Left Ear: External ear normal       Nose: Nose normal    Eyes:      Conjunctiva/sclera: Conjunctivae normal    Cardiovascular:      Rate and Rhythm: Normal rate  Pulmonary:      Effort: Pulmonary effort is normal    Musculoskeletal:         General: Normal range of motion  Cervical back: Normal range of motion  Skin:     General: Skin is warm and dry  Comments: Two small puncture wounds noted on right index finger  No loss of ROM or sensation  Bleeding is stopped at this time   Neurological:      Mental Status: She is alert           Vital Signs  ED Triage Vitals [09/12/22 1536]   Temperature Pulse Respirations Blood Pressure SpO2   98 8 °F (37 1 °C) 81 16 118/74 99 %      Temp Source Heart Rate Source Patient Position - Orthostatic VS BP Location FiO2 (%)   Tympanic Monitor -- Left arm --      Pain Score       --           Vitals:    09/12/22 1536   BP: 118/74   Pulse: 81         Visual Acuity      ED Medications  Medications - No data to display    Diagnostic Studies  Results Reviewed     None                 No orders to display              Procedures  Procedures         ED Course  ED Course as of 09/12/22 1631   Mon Sep 12, 2022   1552 Last TDAP [id number] per review                                             MDM  Number of Diagnoses or Management Options  Cat bite, initial encounter: new and does not require workup  Diagnosis management comments: Patient comes in after being bitten by a cat  No acute distress  Bleeding stopped at this time  Patient is utd on tdap  Patient has chosen to watch and wait animal for 10 days instead of starting rabies at this time  Patient was advised to come back immediately If any concern for rabies  Patient d/c home on moxiflocain, due to penicillin allergy    Counseling: I had a detailed discussion with the patient and/or guardian regarding: the historical points, exam findings, and any diagnostic results supporting the discharge diagnosis, lab results, radiology results, discharge instructions reviewed with patient and/or family/caregiver and understanding was verbalized   Instructions given to return to the emergency department if symptoms worsen or persist, or if there are any questions or concerns that arise at home       All labs reviewed and utilized in the medical decision making process     All radiology studies independently viewed by me and interpreted by the radiologist     Portions of the record may have been created with voice recognition software   Occasional wrong word or "sound a like" substitutions may have occurred due to the inherent limitations of voice recognition software   Read the chart carefully and recognize, using context, where substitutions have occurred  Risk of Complications, Morbidity, and/or Mortality  Presenting problems: minimal  Diagnostic procedures: minimal  Management options: minimal    Patient Progress  Patient progress: stable      Disposition  Final diagnoses:   Cat bite, initial encounter     Time reflects when diagnosis was documented in both MDM as applicable and the Disposition within this note     Time User Action Codes Description Comment    9/12/2022  3:53 PM Dino Neville [[id number]] Cat bite, initial encounter       ED Disposition     ED Disposition   Discharge    Condition   Stable    Date/Time   Mon Sep 12, 2022  3:53 PM    [address] discharge to home/self care  Follow-up Information     Follow up With Specialties Details Why Contact Info Additional [address], [address] Internal Medicine, Nurse Practitioner   [address] [facility] [phone]       [facility] Emergency Department Emergency Medicine  As needed, If symptoms worsen [address] [place] [id number] [phone] [facility] Emergency Department, [address] [place] [address] WITH [address]          Discharge Medication List as of 9/12/2022  3:55 PM      START taking these medications    Details   moxifloxacin (AVELOX) 400 MG tablet Take 1 tablet (400 mg total) by mouth daily for 7 days, Starting Mon 9/12/2022, Until Mon 9/19/2022, Normal         CONTINUE these medications which have NOT CHANGED    Details   flecainide (TAMBOCOR) 50 mg tablet Take 1 tablet (50 mg total) by mouth 3 (three) times a day, Starting Mon 7/12/2021, Normal             No discharge procedures on file      PDMP Review     None          ED Provider  Electronically Signed by           Diane Oro PA-C  09/12/22 7068

## 2022-09-12 NOTE — Clinical Note
Kin Long was seen and treated in our emergency department on 9/12/2022  No restrictions            Diagnosis:     Sonia    She may return on this date: If you have any questions or concerns, please don't hesitate to call        Chelsea Bustillos PA-C    ______________________________           _______________          _______________  Hospital Representative                              Date                                Time

## 2022-10-26 ENCOUNTER — VBI (OUTPATIENT)
Dept: ADMINISTRATIVE | Facility: OTHER | Age: 27
End: 2022-10-26

## 2022-11-11 ENCOUNTER — VBI (OUTPATIENT)
Dept: ADMINISTRATIVE | Facility: OTHER | Age: 27
End: 2022-11-11

## 2022-11-15 ENCOUNTER — NURSE TRIAGE (OUTPATIENT)
Dept: OTHER | Facility: OTHER | Age: 27
End: 2022-11-15

## 2022-11-16 ENCOUNTER — TELEPHONE (OUTPATIENT)
Dept: CARDIOLOGY CLINIC | Facility: CLINIC | Age: 27
End: 2022-11-16

## 2022-11-16 NOTE — TELEPHONE ENCOUNTER
Emily Butt  [phone]  11/15/2022 - 11:57pm  Message:  She has been having a dull pain in her chest on her left side and would like to speak to someone about it ASAP

## 2022-11-16 NOTE — TELEPHONE ENCOUNTER
Regarding: Chest pain   ----- Message from [facility] sent at 11/15/2022 11:43 PM EST -----  "I have sharp pains that come and goes on the left side of my chest and today was stronger   I was wondering if this is something I should be worried about?"

## 2022-11-16 NOTE — TELEPHONE ENCOUNTER
Jeff Parrish called me back and said she hasn't had any of those feelings today    it was a dull feeling in her chest  The first one was sharper than she normally feels it  She does want to report she was anxious at that time  She does take flecainide TID   Heart rate was normal

## 2022-11-16 NOTE — TELEPHONE ENCOUNTER
Called the patient to see how she is  Dominique Mendoza and she didn't answer so I left her a message to call me back  I told her I will route this call to dr Lito Diaz to see if he recommends anything

## 2022-11-16 NOTE — TELEPHONE ENCOUNTER
Patient goes to [address] [place] [address] Cardiology [facility]  We don't triage for them, transferred the call to their after hours answering service

## 2023-02-15 ENCOUNTER — OFFICE VISIT (OUTPATIENT)
Dept: FAMILY MEDICINE CLINIC | Facility: CLINIC | Age: 28
End: 2023-02-15

## 2023-02-15 VITALS
SYSTOLIC BLOOD PRESSURE: 126 MMHG | BODY MASS INDEX: 41.82 KG/M2 | HEIGHT: 63 IN | TEMPERATURE: 99.8 F | HEART RATE: 96 BPM | WEIGHT: 236 LBS | DIASTOLIC BLOOD PRESSURE: 88 MMHG | OXYGEN SATURATION: 97 %

## 2023-02-15 DIAGNOSIS — M79.621 TENDERNESS OF RIGHT AXILLA: Primary | ICD-10-CM

## 2023-02-15 NOTE — PROGRESS NOTES
Assessment/Plan:    Problem List Items Addressed This Visit    None  Visit Diagnoses     Tenderness of right axilla    -  Primary           Diagnoses and all orders for this visit:    Tenderness of right axilla      No problem-specific Assessment & Plan notes found for this encounter  Subjective:      Patient ID: Tom Allen is a 25 y o  female  Right axilla pain  Notes pain started 2 days ago patient states that she has had a history of a cyst developing in the right axilla  She usually applies warm compresses to the area in the cyst will rupture spontaneously  There is appreciated tenderness in the right axilla but there is no obvious signs of infection, abscess, cellulitic symptomology  Patient reports a serous whitish fluid that had drained from her right axilla yesterday  Recommend continue with conservative treatment and warm compresses  If the condition gets worse patient is advised to send us pictures and or F/U  The following portions of the patient's history were reviewed and updated as appropriate:   She has a past medical history of ADHD, Allergic, Anxiety, Depression, Hypertension, Junctional tachycardia ([address] ), and Obesity  ,  does not have any pertinent problems on file  ,   has a past surgical history that includes  section; Appendectomy; and [place] tooth extraction  ,  family history includes ADD / ADHD in her brother and sister; Anxiety disorder in her mother and sister; Asthma in her mother; Atrial fibrillation in her father; Bipolar disorder in her brother, father, and mother; Depression in her mother; Diabetes in her sister; Psoriasis in her father; Suicide Attempts in her brother; Valvular heart disease in her brother  She was adopted  ,   reports that she has quit smoking  Her smoking use included cigarettes  She has a 2 50 pack-year smoking history  She has never used smokeless tobacco  She reports current alcohol use   She reports that she does not currently use drugs  ,  is allergic to azithromycin, penicillins, and vicodin [hydrocodone-acetaminophen]     Current Outpatient Medications   Medication Sig Dispense Refill   • flecainide (TAMBOCOR) 50 mg tablet Take 1 tablet (50 mg total) by mouth 3 (three) times a day 270 tablet 3     No current facility-administered medications for this visit  Review of Systems   All other systems reviewed and are negative  Objective:  Vitals:    02/15/23 1305   BP: 126/88   BP Location: Left arm   Patient Position: Sitting   Cuff Size: Adult   Pulse: 96   Temp: 99 8 °F (37 7 °C)   TempSrc: Tympanic   SpO2: 97%   Weight: 107 kg (236 lb)   Height: 5' 2 5" (1 588 m)     Body mass index is 42 48 kg/m²  Physical Exam  Vitals and nursing note reviewed  Constitutional:       Appearance: Normal appearance  She is well-developed  Interventions: Face mask in place  HENT:      Head: Normocephalic and atraumatic  Mouth/Throat:      Pharynx: Uvula midline  Eyes:      General: Lids are normal       Conjunctiva/sclera: Conjunctivae normal       Pupils: Pupils are equal, round, and reactive to light  Neck:      Thyroid: No thyroid mass or thyromegaly  Vascular: No JVD  Trachea: Trachea and phonation normal    Cardiovascular:      Rate and Rhythm: Normal rate and regular rhythm  Pulses: Normal pulses  Heart sounds: Normal heart sounds, S1 normal and S2 normal  No murmur heard  No friction rub  No gallop  Pulmonary:      Effort: Pulmonary effort is normal       Breath sounds: Normal breath sounds  Abdominal:      General: Bowel sounds are normal       Palpations: Abdomen is soft  Tenderness: There is no abdominal tenderness  Genitourinary:     Comments: Deferred   Musculoskeletal:         General: Normal range of motion  Cervical back: Full passive range of motion without pain, normal range of motion and neck supple  Right lower leg: No edema  Left lower leg: No edema  Lymphadenopathy:      Head:      Right side of head: No submental, submandibular, tonsillar, preauricular, posterior auricular or occipital adenopathy  Left side of head: No submental, submandibular, tonsillar, preauricular, posterior auricular or occipital adenopathy  Cervical: No cervical adenopathy  Skin:     General: Skin is warm and dry  Capillary Refill: Capillary refill takes less than 2 seconds  Neurological:      General: No focal deficit present  Mental Status: She is alert and oriented to person, place, and time  Cranial Nerves: No cranial nerve deficit  Sensory: Sensation is intact  Motor: Motor function is intact  Coordination: Coordination is intact  Gait: Gait is intact  Deep Tendon Reflexes: Reflexes are normal and symmetric  Psychiatric:         Attention and Perception: Attention and perception normal          Mood and Affect: Mood and affect normal          Speech: Speech normal          Behavior: Behavior normal  Behavior is cooperative  Cognition and Memory: Cognition normal            Portions of the record may have been created with voice recognition software  Occasional wrong word or "sound a like" substitutions may have occurred due to the inherent limitations of voice recognition software  Read the chart carefully and recognize, using context, where substitutions have occurred  Contact me with any questions

## 2023-02-20 ENCOUNTER — CLINICAL SUPPORT (OUTPATIENT)
Dept: FAMILY MEDICINE CLINIC | Facility: CLINIC | Age: 28
End: 2023-02-20

## 2023-02-20 DIAGNOSIS — L72.3 SEBACEOUS CYST OF RIGHT AXILLA: Primary | ICD-10-CM

## 2023-02-20 RX ORDER — CLINDAMYCIN HYDROCHLORIDE 300 MG/1
300 CAPSULE ORAL 4 TIMES DAILY
Qty: 28 CAPSULE | Refills: 0 | Status: SHIPPED | OUTPATIENT
Start: 2023-02-20 | End: 2023-02-27

## 2023-02-22 LAB
BACTERIA WND AEROBE CULT: ABNORMAL
GRAM STN SPEC: ABNORMAL

## 2023-02-27 ENCOUNTER — TELEPHONE (OUTPATIENT)
Dept: OTHER | Facility: OTHER | Age: 28
End: 2023-02-27

## 2023-02-28 NOTE — TELEPHONE ENCOUNTER
Patient is calling regarding cancelling an appointment  Patient would like to reschedule this appointment due to weather       Date/Time: 02/28/2023 @ 1:00 pm     Patient was rescheduled: YES [] NO [x]    Patient requesting call back to reschedule: YES [x] NO []

## 2023-03-06 ENCOUNTER — CONSULT (OUTPATIENT)
Dept: SURGERY | Facility: CLINIC | Age: 28
End: 2023-03-06

## 2023-03-06 VITALS
SYSTOLIC BLOOD PRESSURE: 100 MMHG | DIASTOLIC BLOOD PRESSURE: 78 MMHG | OXYGEN SATURATION: 99 % | TEMPERATURE: 97.6 F | HEART RATE: 87 BPM | RESPIRATION RATE: 18 BRPM | BODY MASS INDEX: 42.6 KG/M2 | WEIGHT: 236.7 LBS

## 2023-03-06 DIAGNOSIS — L72.3 SEBACEOUS CYST OF RIGHT AXILLA: ICD-10-CM

## 2023-03-06 NOTE — PROGRESS NOTES
Assessment/Plan:    Sebaceous cyst of right axilla      no acute inflammation, no palpable mass, no fluctuance  Minimal scarring from prior episodes  No findings to suggest an inflamed epidermal inclusion cyst        Diagnoses and all orders for this visit:    Sebaceous cyst of right axilla  -     Ambulatory Referral to General Surgery          Subjective:      Patient ID: Chichi Cazares is a 25 y o  female  The patient is a 26-year-old white female with a history of hypertension and junctional tachycardia presenting with a right axillary infection  The patient was seen 2 weeks ago and diagnosed with a sebaceous cyst in her right axilla,, which subsequently drained spontaneously and the inflammation resolved  She now presents with what appears to be scarring from previous episodes most consistent with hidradenitis suppurativa  Exam there is no fluctuance to warrant further incision and drainage  The following portions of the patient's history were reviewed and updated as appropriate: allergies, current medications, past family history, past medical history, past social history, past surgical history and problem list     Review of Systems   Constitutional: Negative for chills and fever  Respiratory: Negative  Cardiovascular: Negative for chest pain  Gastrointestinal: Negative  Skin: Negative for wound  Objective:      /78 (BP Location: Left arm, Patient Position: Sitting, Cuff Size: Standard)   Pulse 87   Temp 97 6 °F (36 4 °C) (Temporal)   Resp 18   Wt 107 kg (236 lb 11 2 oz)   SpO2 99%   BMI 42 60 kg/m²          Physical Exam  Constitutional:       Appearance: Normal appearance  HENT:      Head: Normocephalic  Cardiovascular:      Rate and Rhythm: Normal rate  Pulmonary:      Effort: Pulmonary effort is normal    Skin:     General: Skin is warm and dry

## 2023-03-06 NOTE — ASSESSMENT & PLAN NOTE
no acute inflammation, no palpable mass, no fluctuance  Minimal scarring from prior episodes      No findings to suggest an inflamed epidermal inclusion cyst

## 2023-03-06 NOTE — PATIENT INSTRUCTIONS
You do not appear to have a sebaceous cyst, rather you appear to have a chronic skin condition called Hidradenitis suppurativa  At this point it does not appear that you need to have an incision and drainage, and I would suggest using warm compresses  If something comes to a head, please call and we will get you in to be seen, and if appropriate have an incision and drainage

## 2023-07-24 ENCOUNTER — OFFICE VISIT (OUTPATIENT)
Dept: CARDIOLOGY CLINIC | Facility: CLINIC | Age: 28
End: 2023-07-24
Payer: COMMERCIAL

## 2023-07-24 VITALS
HEART RATE: 74 BPM | BODY MASS INDEX: 42.88 KG/M2 | SYSTOLIC BLOOD PRESSURE: 112 MMHG | WEIGHT: 242 LBS | HEIGHT: 63 IN | DIASTOLIC BLOOD PRESSURE: 74 MMHG

## 2023-07-24 DIAGNOSIS — R07.89 OTHER CHEST PAIN: ICD-10-CM

## 2023-07-24 DIAGNOSIS — I47.1 JUNCTIONAL TACHYCARDIA (HCC): Primary | ICD-10-CM

## 2023-07-24 DIAGNOSIS — R53.82 CHRONIC FATIGUE: ICD-10-CM

## 2023-07-24 PROCEDURE — 99214 OFFICE O/P EST MOD 30 MIN: CPT | Performed by: INTERNAL MEDICINE

## 2023-07-24 PROCEDURE — 93000 ELECTROCARDIOGRAM COMPLETE: CPT | Performed by: INTERNAL MEDICINE

## 2023-07-24 NOTE — PROGRESS NOTES
Patient ID: Valerie Finney is a 25 y.o. female. Plan:      Other chest pain  Non cardiac. Lasts seconds. Chronic fatigue  Much improved off metoprolol. Junctional tachycardia (HCC)  No further spells. Follow up Plan/Other summary comments:  Return in about 1 year (around 2024). HPI: Patient is here for follow-up today regarding the above. Since last visit she has felt reasonably well. On occasion she gets chest pain but only last for seconds. No association with effort. No palpitations of consequence. No syncope or near syncope. During pregnancy in 2018 patient was found to have significant tachycardia. Ultimately she was told that she had a junctional tachycardia. She was placed on flecainide and metoprolol with reasonably good affect. That is symptoms now are of shorter duration and less frequent. In retrospect she has had intermittent feelings of tachycardia for many years. Results for orders placed or performed in visit on 23   POCT ECG    Impression    NSR. NSSTs. Most recent or relevant cardiac/vascular testing:     She describes normal echo in 2018. Past Surgical History:   Procedure Laterality Date   • APPENDECTOMY     •  SECTION     • WISDOM TOOTH EXTRACTION      all 4       Lipid Profile:   Lab Results   Component Value Date    TRIG 94 2022    HDL 36 (L) 2022         Review of Systems   10  point ROS  was otherwise non pertinent or negative except as per HPI or as below. Gait:  Normal.        Objective:     /74   Pulse 74   Ht 5' 2.5" (1.588 m)   Wt 110 kg (242 lb)   BMI 43.56 kg/m²     PHYSICAL EXAM:    General:  Normal appearance in no distress. Eyes:  Anicteric. Oral mucosa:  Moist.  Neck:  No JVD. Carotid upstrokes are brisk without bruits. No masses. Chest:  Clear to auscultation. Cardiac:  No palpable PMI. Normal S1 and S2. No murmur gallop or rub. Abdomen:  Soft and nontender.  No palpable organomegaly or aortic enlargement. Extremities:  No peripheral edema. Musculoskeletal:  Symmetric. Vascular:  Femoral pulses are brisk without bruits. Popliteal pulses are intact bilaterally. Pedal pulses are intact. Neuro:  Grossly symmetric. Psych:  Alert and oriented x3.         Current Outpatient Medications:   •  flecainide (TAMBOCOR) 50 mg tablet, Take 1 tablet (50 mg total) by mouth 3 (three) times a day, Disp: 270 tablet, Rfl: 3  •  metoprolol tartrate (LOPRESSOR) 50 mg tablet, Take 50 mg by mouth (Patient not taking: Reported on 7/24/2023), Disp: , Rfl:   Allergies   Allergen Reactions   • Azithromycin Anaphylaxis   • Erythromycin Anaphylaxis   • Codeine Other (See Comments)   • Latex Other (See Comments)   • Penicillins Hives   • Vicodin [Hydrocodone-Acetaminophen] Rash     Past Medical History:   Diagnosis Date   • ADHD    • Allergic    • Anxiety    • Depression    • Hypertension    • Junctional tachycardia ([address])    • Obesity            Social History     Tobacco Use   Smoking Status Former   • Packs/day: 0.25   • Years: 10.00   • Total pack years: 2.50   • Types: Cigarettes   Smokeless Tobacco Never

## 2023-07-27 ENCOUNTER — HOSPITAL ENCOUNTER (EMERGENCY)
Facility: HOSPITAL | Age: 28
Discharge: HOME | End: 2023-07-27
Attending: EMERGENCY MEDICINE
Payer: COMMERCIAL

## 2023-07-27 VITALS
DIASTOLIC BLOOD PRESSURE: 73 MMHG | HEART RATE: 87 BPM | TEMPERATURE: 97.6 F | RESPIRATION RATE: 18 BRPM | SYSTOLIC BLOOD PRESSURE: 144 MMHG | OXYGEN SATURATION: 96 %

## 2023-07-27 DIAGNOSIS — L03.90 CELLULITIS: ICD-10-CM
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
## 2023-07-27 DIAGNOSIS — S09.90XA INJURY OF HEAD, INITIAL ENCOUNTER: ICD-10-CM

## 2023-07-27 PROCEDURE — 99284 EMERGENCY DEPT VISIT MOD MDM: CPT | Performed by: EMERGENCY MEDICINE

## 2023-07-27 PROCEDURE — 99283 EMERGENCY DEPT VISIT LOW MDM: CPT

## 2023-07-27 RX ORDER — CLINDAMYCIN HYDROCHLORIDE 300 MG/1
300 CAPSULE ORAL 3 TIMES DAILY
Qty: 21 CAPSULE | Refills: 0 | Status: SHIPPED | OUTPATIENT
Start: 2023-07-27 | End: 2023-08-03

## 2023-07-27 RX ORDER — CLINDAMYCIN HYDROCHLORIDE 150 MG/1
300 CAPSULE ORAL ONCE
Status: COMPLETED | OUTPATIENT
Start: 2023-07-27 | End: 2023-07-27

## 2023-07-27 RX ADMIN — CLINDAMYCIN HYDROCHLORIDE 300 MG: 150 CAPSULE ORAL at 19:38

## 2023-07-27 NOTE — ED PROVIDER NOTES
History  Chief Complaint   Patient presents with   • Head Injury     Patient reports an hour prior to arrival, patient had a metal gate fall and hit her on the head. No LOC, no blood thinners    • Cat Bite     Patient bit by her cat on right arm two days ago. Reports increase in pain/redness to area. Cat is UTD on shots     78-year-old female with history of ADHD, presents with multiple complaints including a head injury about 2 hours prior to arrival.  Patient says that a metal gate moves up and down was lowered and fell onto her head. She denies fall, loss of consciousness. She describes mild headache, some phonophobia. No anticoagulation or antiplatelet. She also complains of right-sided arm rash which started gradually over the last 2 days. Her cat bit her provoked attack. Cat is otherwise acting normally and has no isolation with any outdoor animals. Up-to-date on their shots. Patient has mild erythema which she traced with a marker and the erythema extended outside of the marker margins. Prior to Admission Medications   Prescriptions Last Dose Informant Patient Reported?  Taking?   flecainide (TAMBOCOR) 50 mg tablet   No No   Sig: Take 1 tablet (50 mg total) by mouth 3 (three) times a day   metoprolol tartrate (LOPRESSOR) 50 mg tablet   Yes No   Sig: Take 50 mg by mouth   Patient not taking: Reported on 2023      Facility-Administered Medications: None       Past Medical History:   Diagnosis Date   • ADHD    • Allergic    • Anxiety    • Depression    • Hypertension    • Junctional tachycardia ([address])    • Obesity        Past Surgical History:   Procedure Laterality Date   • APPENDECTOMY     •  SECTION     • WISDOM TOOTH EXTRACTION      all 4       Family History   Adopted: Yes   Problem Relation Age of Onset   • Asthma Mother    • Depression Mother    • Anxiety disorder Mother    • Bipolar disorder Mother    • Atrial fibrillation Father    • Bipolar disorder Father    • Psoriasis Father    • Diabetes Sister    • ADD / ADHD Sister    • Anxiety disorder Sister    • Valvular heart disease Brother    • ADD / ADHD Brother    • Bipolar disorder Brother    • Suicide Attempts Brother      I have reviewed and agree with the history as documented. E-Cigarette/Vaping   • E-Cigarette Use Current Every Day User    • Comments vape      E-Cigarette/Vaping Substances   • Nicotine Yes    • THC No    • CBD No    • Flavoring Yes    • Other No    • Unknown No      Social History     Tobacco Use   • Smoking status: Former     Packs/day: 0.25     Years: 10.00     Total pack years: 2.50     Types: Cigarettes   • Smokeless tobacco: Never   Vaping Use   • Vaping Use: Every day   • Substances: Nicotine, Flavoring   Substance Use Topics   • Alcohol use: Yes     Comment: Joana   • Drug use: Not Currently       Review of Systems    Physical Exam  Physical Exam  Vitals and nursing note reviewed. Constitutional:       Appearance: Normal appearance. She is well-developed. HENT:      Head: Normocephalic and atraumatic. Eyes:      Conjunctiva/sclera: Conjunctivae normal.      Pupils: Pupils are equal, round, and reactive to light. Neck:      Trachea: No tracheal deviation. Cardiovascular:      Rate and Rhythm: Normal rate and regular rhythm. Heart sounds: Normal heart sounds. No murmur heard. Pulmonary:      Effort: Pulmonary effort is normal. No respiratory distress. Breath sounds: Normal breath sounds. No wheezing or rales. Abdominal:      General: Bowel sounds are normal. There is no distension. Palpations: Abdomen is soft. Tenderness: There is no abdominal tenderness. Musculoskeletal:         General: No deformity. Cervical back: Normal range of motion and neck supple. Comments: 4 cm cellulitis on the dorsum of the right arm with superficial puncture   Skin:     General: Skin is warm and dry. Capillary Refill: Capillary refill takes less than 2 seconds. Neurological:      General: No focal deficit present. Mental Status: She is alert and oriented to person, place, and time. Sensory: No sensory deficit. Psychiatric:         Judgment: Judgment normal.         Vital Signs  ED Triage Vitals [07/27/23 1911]   Temperature Pulse Respirations Blood Pressure SpO2   97.6 °F (36.4 °C) 87 18 144/73 96 %      Temp Source Heart Rate Source Patient Position - Orthostatic VS BP Location FiO2 (%)   Temporal -- -- -- --      Pain Score       3           Vitals:    07/27/23 1911   BP: 144/73   Pulse: 87         Visual Acuity      ED Medications  Medications   clindamycin (CLEOCIN) capsule 300 mg (300 mg Oral Given 7/27/23 1938)       Diagnostic Studies  Results Reviewed     None                 No orders to display              Procedures  Procedures         ED Course                               SBIRT 20yo+    Flowsheet Row Most Recent Value   Initial Alcohol Screen: US AUDIT-C     1. How often do you have a drink containing alcohol? 0 Filed at: 07/27/2023 1911   2. How many drinks containing alcohol do you have on a typical day you are drinking? 0 Filed at: 07/27/2023 1911   3a. Male UNDER 65: How often do you have five or more drinks on one occasion? 0 Filed at: 07/27/2023 1911   3b. FEMALE Any Age, or MALE 65+: How often do you have 4 or more drinks on one occassion? 0 Filed at: 07/27/2023 1911   Audit-C Score 0 Filed at: 07/27/2023 1911   CASSIDY: How many times in the past year have you. .. Used an illegal drug or used a prescription medication for non-medical reasons? Never Filed at: 07/27/2023 1911                    Medical Decision Making  78-year-old female presents with head injury, Japanese CT head negative, suspect mild concussion; appropriate for outpatient follow-up. CT head not indicated. Also with cat bite and cellulitis of the right dorsum wrist.  Localized cellulitis.   Will start on antibiotics, clindamycin as patient has Augmentin allergy. Cat bite, initial encounter: acute illness or injury  Cellulitis: acute illness or injury  Injury of head, initial encounter: acute illness or injury  Risk  Prescription drug management. Disposition  Final diagnoses:   Cat bite, initial encounter   Injury of head, initial encounter   Cellulitis     Time reflects when diagnosis was documented in both MDM as applicable and the Disposition within this note     Time User Action Codes Description Comment    7/27/2023  7:34 PM Maxime Nan Add [W55.01XA] Cat bite, initial encounter     7/27/2023  7:40 PM Maxime Nan Add [O64.39GX] Injury of head, initial encounter     7/27/2023  7:42 PM Maxime Nan Add [O58.61] Cellulitis       ED Disposition     ED Disposition   Discharge    Condition   Stable    Date/Time   Thu Jul 27, 2023  7:39 PM    Comment   Enzo Herman discharge to home/self care. Follow-up Information     Follow up With Specialties Details Why Contact Info    [address] [place] [address]  Schedule an appointment as soon as possible for a visit   [address] [place] [address], [address] [place] [address] Internal Medicine, Nurse Practitioner  As needed [facility] [address]  [phone]            Discharge Medication List as of 7/27/2023  7:41 PM      START taking these medications    Details   clindamycin (CLEOCIN) 300 MG capsule Take 1 capsule (300 mg total) by mouth 3 (three) times a day for 7 days, Starting Thu 7/27/2023, Until Thu 8/3/2023, Normal         CONTINUE these medications which have NOT CHANGED    Details   flecainide (TAMBOCOR) 50 mg tablet Take 1 tablet (50 mg total) by mouth 3 (three) times a day, Starting Wed 9/14/2022, Normal      metoprolol tartrate (LOPRESSOR) 50 mg tablet Take 50 mg by mouth, Historical Med             No discharge procedures on file.     PDMP Review     None          ED Provider  Electronically Signed by           Caroline Macias Briseyda Mckay, DO  07/27/23 2561

## 2023-07-27 NOTE — DISCHARGE INSTRUCTIONS
Return if cellulitis worsens or if new symptoms develop or if you have any other concerns    Watch cat for the next 10 days for signs of rabies

## 2023-09-08 ENCOUNTER — APPOINTMENT (OUTPATIENT)
Dept: LAB | Facility: CLINIC | Age: 28
End: 2023-09-08
Payer: COMMERCIAL

## 2023-09-08 DIAGNOSIS — Z00.00 ANNUAL PHYSICAL EXAM: ICD-10-CM

## 2023-09-08 DIAGNOSIS — Z13.29 SCREENING FOR THYROID DISORDER: ICD-10-CM

## 2023-09-08 DIAGNOSIS — Z13.0 SCREENING FOR DEFICIENCY ANEMIA: ICD-10-CM

## 2023-09-08 DIAGNOSIS — Z13.1 SCREENING FOR DIABETES MELLITUS (DM): ICD-10-CM

## 2023-09-08 DIAGNOSIS — Z13.220 SCREENING FOR HYPERLIPIDEMIA: ICD-10-CM

## 2023-09-08 LAB
ALBUMIN SERPL BCP-MCNC: 4.8 G/DL (ref 3.5–5)
ALP SERPL-CCNC: 72 U/L (ref 34–104)
ALT SERPL W P-5'-P-CCNC: 21 U/L (ref 7–52)
ANION GAP SERPL CALCULATED.3IONS-SCNC: 9 MMOL/L
AST SERPL W P-5'-P-CCNC: 16 U/L (ref 13–39)
BASOPHILS # BLD AUTO: 0.03 THOUSANDS/ÂΜL (ref 0–0.1)
BASOPHILS NFR BLD AUTO: 0 % (ref 0–1)
BILIRUB SERPL-MCNC: 0.78 MG/DL (ref 0.2–1)
BUN SERPL-MCNC: 12 MG/DL (ref 5–25)
CALCIUM SERPL-MCNC: 9.7 MG/DL (ref 8.4–10.2)
CHLORIDE SERPL-SCNC: 105 MMOL/L (ref 96–108)
CHOLEST SERPL-MCNC: 167 MG/DL
CO2 SERPL-SCNC: 25 MMOL/L (ref 21–32)
CREAT SERPL-MCNC: 0.7 MG/DL (ref 0.6–1.3)
EOSINOPHIL # BLD AUTO: 0.17 THOUSAND/ÂΜL (ref 0–0.61)
EOSINOPHIL NFR BLD AUTO: 2 % (ref 0–6)
ERYTHROCYTE [DISTWIDTH] IN BLOOD BY AUTOMATED COUNT: 13.2 % (ref 11.6–15.1)
GFR SERPL CREATININE-BSD FRML MDRD: 116 ML/MIN/1.73SQ M
GLUCOSE P FAST SERPL-MCNC: 104 MG/DL (ref 65–99)
HCT VFR BLD AUTO: 47.7 % (ref 34.8–46.1)
HDLC SERPL-MCNC: 43 MG/DL
HGB BLD-MCNC: 16.1 G/DL (ref 11.5–15.4)
IMM GRANULOCYTES # BLD AUTO: 0.03 THOUSAND/UL (ref 0–0.2)
IMM GRANULOCYTES NFR BLD AUTO: 0 % (ref 0–2)
LDLC SERPL CALC-MCNC: 100 MG/DL (ref 0–100)
LYMPHOCYTES # BLD AUTO: 1.91 THOUSANDS/ÂΜL (ref 0.6–4.47)
LYMPHOCYTES NFR BLD AUTO: 22 % (ref 14–44)
MCH RBC QN AUTO: 30.8 PG (ref 26.8–34.3)
MCHC RBC AUTO-ENTMCNC: 33.8 G/DL (ref 31.4–37.4)
MCV RBC AUTO: 91 FL (ref 82–98)
MONOCYTES # BLD AUTO: 0.93 THOUSAND/ÂΜL (ref 0.17–1.22)
MONOCYTES NFR BLD AUTO: 11 % (ref 4–12)
NEUTROPHILS # BLD AUTO: 5.75 THOUSANDS/ÂΜL (ref 1.85–7.62)
NEUTS SEG NFR BLD AUTO: 65 % (ref 43–75)
NRBC BLD AUTO-RTO: 0 /100 WBCS
PLATELET # BLD AUTO: 455 THOUSANDS/UL (ref 149–390)
PMV BLD AUTO: 9.9 FL (ref 8.9–12.7)
POTASSIUM SERPL-SCNC: 4.1 MMOL/L (ref 3.5–5.3)
PROT SERPL-MCNC: 8.2 G/DL (ref 6.4–8.4)
RBC # BLD AUTO: 5.22 MILLION/UL (ref 3.81–5.12)
SODIUM SERPL-SCNC: 139 MMOL/L (ref 135–147)
TRIGL SERPL-MCNC: 121 MG/DL
TSH SERPL DL<=0.05 MIU/L-ACNC: 1.83 UIU/ML (ref 0.45–4.5)
WBC # BLD AUTO: 8.82 THOUSAND/UL (ref 4.31–10.16)

## 2023-09-08 PROCEDURE — 84443 ASSAY THYROID STIM HORMONE: CPT

## 2023-09-08 PROCEDURE — 85025 COMPLETE CBC W/AUTO DIFF WBC: CPT

## 2023-09-08 PROCEDURE — 80053 COMPREHEN METABOLIC PANEL: CPT

## 2023-09-08 PROCEDURE — 80061 LIPID PANEL: CPT

## 2023-09-08 PROCEDURE — 36415 COLL VENOUS BLD VENIPUNCTURE: CPT

## 2023-09-11 ENCOUNTER — OFFICE VISIT (OUTPATIENT)
Dept: FAMILY MEDICINE CLINIC | Facility: CLINIC | Age: 28
End: 2023-09-11
Payer: COMMERCIAL

## 2023-09-11 VITALS
BODY MASS INDEX: 39.87 KG/M2 | SYSTOLIC BLOOD PRESSURE: 136 MMHG | HEIGHT: 63 IN | HEART RATE: 77 BPM | WEIGHT: 225 LBS | DIASTOLIC BLOOD PRESSURE: 78 MMHG | OXYGEN SATURATION: 99 % | TEMPERATURE: 99 F

## 2023-09-11 DIAGNOSIS — Z00.00 ANNUAL PHYSICAL EXAM: Primary | ICD-10-CM

## 2023-09-11 DIAGNOSIS — F41.1 GAD (GENERALIZED ANXIETY DISORDER): ICD-10-CM

## 2023-09-11 DIAGNOSIS — Z13.31 POSITIVE DEPRESSION SCREENING: ICD-10-CM

## 2023-09-11 DIAGNOSIS — I47.1 JUNCTIONAL TACHYCARDIA: ICD-10-CM

## 2023-09-11 PROCEDURE — 99395 PREV VISIT EST AGE 18-39: CPT | Performed by: NURSE PRACTITIONER

## 2023-09-11 PROCEDURE — 99213 OFFICE O/P EST LOW 20 MIN: CPT | Performed by: NURSE PRACTITIONER

## 2023-09-11 RX ORDER — FLUOXETINE HYDROCHLORIDE 20 MG/1
20 CAPSULE ORAL DAILY
Qty: 30 CAPSULE | Refills: 0 | Status: SHIPPED | OUTPATIENT
Start: 2023-09-11
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
## 2023-09-11 NOTE — PATIENT INSTRUCTIONS
Apps and Websites for Counseling, Anxiety/Depression, Chronic Pain, Lifestyle Change, Problem-solving, Self-Esteem, Anger Management, Coping with Uncertainty    [address]: [phone]    [facility]: [phone]    Delia Valerioviolet and [address] Patino: [phone]    National Crisis Phone Line: [phone]  [address] [place] [address] Street: [phone] or [phone]    (Prices current as of 9/5/19)    Mood Kit - Available in Rubikloud for $4.99. Supports a person's success in specific situations, includes thought , mood tracker, and journal.  Can be accessed in an unstructured way and used as an unguided self-help ravinder. 2.  Moodnotes - Available in Apple Ravinder Store for $4.99. Focuses on healthier thinking habits and identifying "traps" in thinking. Tracks mood over a period of time and identifies factors that influence mood. 3.  MoodMission - Available in [facility] for free. Includes five "missions" to promote confidence in handling stressors and coping in specific situations. The ravinder personalizes its style and techniques based on when the user engages it most frequently. In-ravinder rewards are used to motivate, increase fun and self-confidence. Helpful for patients who need improvement in mood or a decrease in anxiety and depression symptoms. 4.   What's Up - Available in Self Point for free. Recognizes negative thinking patterns and methods to overcome them. Uses helpful metaphors, a catastrophe scale, grounding techniques, and breathing exercises. Has the ability to sync data across multiple devices and protect this information with a unique pass code. Has the capability to be active in forums where people can discuss similar feelings and strategies that have been helpful. 5.  Moodpath - Available in Self Point for free.   Uses daily screenings to create a better understanding of thoughts, feelings, and emotions. When needed, it provides a discussion guide to talking with a medical professional based on the responses to daily screenings. Includes over 150 psychological exercises and videos to promote and strengthen overall mental health. 6.  MindShift - Available in Tell[employer] for free. Helpful for youth and young adults in coping with anxiety. Creates an individualized toolbox to help users deal with test anxiety, perfectionism, social anxiety, worry, panic, and conflict. Includes directions on how to construct “belief experiments” to trial common beliefs that feed anxiety, guided relaxation, as well as tools and tips to help establish and accomplish goals. Differentiates between helpful and unhelpful anxiety, and explains how to overcome fears by gradually facing them in manageable steps. 7.  CBT-I  - Available in [employer] for free. For insomnia. Educates about sleep, developing positive sleep routines, and improved sleep environment. Encourages user to change behaviors which will provide confidence for better sleep on a regular basis. 8.  Qnekt.uk - Free website. Provides self-help and therapy resources that encourages change to combat negative and destructive thought patterns. Includes access to numerous handouts on a variety of symptoms related to anxiety, depression, low self-esteem, panic attacks, social disorders, and more. The solution section has interventions that can be printed and saved for future use. 9.  Woebot - Available in [employer] for free. Recommended for age 16+. Friendly self-care  that helps you think through situations with step-by-step guidance using counseling tools, learn about yourself with intelligent mood tracking, and master skills to reduce stress and live happier through 100+ evidence-based stories from clinicians.   Chat as often or as little as you'd like, whenever you'd like to. 10.  Gabriel:  SHO  CBT DBT Chatbot - Available in Apple appsstore and Google Play for free, has in-ravinder purchases. Recommended for 12+. Psychologist support at $30/month, 50% off to start. Sachin Nurse / Sabina Haro is free. Uses techniques of CBT, DBT, yoga, and meditation to support your needs regarding stress, anxiety, sleep, loss, and a full range of other mental health and wellness issues. 11.  Curable Pain Relief - Available in Apple Ravinder store and Google Play for free, has in-ravinder purchases. Teaches about the chronic pain cycle and how to reverse it, while retraining your brain and nervous system for long-term results. Smart  Tiffanie guides user through updates in pain science to identify, target, eliminate the factors that are keeping user "stuck" in the pain cycle. 12.  [facility] - Available in Apple Ravinder store and Google Play for a fee. Subscription service, starting at $59/week (billed monthly). All plans include unlimited messaging, and add live video sessions if desired. Unlimited messaging therapy for teens ages 16-16 and special services for couples therapy. You can change therapists or stop subscription renewal at any time. Recommended for 13+. User is matched with a licensed therapist in your state and communicate on your device by text, audio, and video from anywhere, at any time. User will hear back at least once a day, 5 days per week.

## 2023-09-26 ENCOUNTER — TELEPHONE (OUTPATIENT)
Dept: PSYCHIATRY | Facility: CLINIC | Age: 28
End: 2023-09-26

## 2023-09-26 NOTE — TELEPHONE ENCOUNTER
Contacted patient in regards to Routine Referral in attempts to verify patient's needs of services and add patient to proper wait list. spoke with patient whom stated Angie Capone, female provider pref.

## 2023-10-02 DIAGNOSIS — F41.1 GAD (GENERALIZED ANXIETY DISORDER): ICD-10-CM

## 2023-10-02 DIAGNOSIS — Z13.31 POSITIVE DEPRESSION SCREENING: ICD-10-CM

## 2023-10-02 RX ORDER — FLUOXETINE HYDROCHLORIDE 20 MG/1
20 CAPSULE ORAL DAILY
Qty: 30 CAPSULE | Refills: 0 | Status: SHIPPED | OUTPATIENT
Start: 2023-10-02

## 2023-11-17 DIAGNOSIS — I47.19 JUNCTIONAL TACHYCARDIA: ICD-10-CM

## 2023-11-17 RX ORDER — FLECAINIDE ACETATE 50 MG/1
50 TABLET ORAL 3 TIMES DAILY
Qty: 270 TABLET | Refills: 3 | Status: SHIPPED | OUTPATIENT
Start: 2023-11-17

## 2023-12-15 DIAGNOSIS — Z13.31 POSITIVE DEPRESSION SCREENING: ICD-10-CM

## 2023-12-15 DIAGNOSIS — F41.1 GAD (GENERALIZED ANXIETY DISORDER): ICD-10-CM

## 2023-12-15 RX ORDER — FLUOXETINE HYDROCHLORIDE 20 MG/1
20 CAPSULE ORAL DAILY
Qty: 30 CAPSULE | Refills: 0 | Status: SHIPPED | OUTPATIENT
Start: 2023-12-15

## 2024-03-29 ENCOUNTER — TELEPHONE (OUTPATIENT)
Dept: PSYCHIATRY | Facility: CLINIC | Age: 29
End: 2024-03-29

## 2024-03-29 NOTE — TELEPHONE ENCOUNTER
Contacted patient off of Medication Management  to verify needs of services in attempts to offer patient an appointment at [facility] . LVM for patient to contact intake dept  in regards to wait list

## 2024-09-06 ENCOUNTER — APPOINTMENT (EMERGENCY)
Dept: CT IMAGING | Facility: HOSPITAL | Age: 29
End: 2024-09-06
Payer: COMMERCIAL

## 2024-09-06 ENCOUNTER — APPOINTMENT (EMERGENCY)
Dept: NON INVASIVE DIAGNOSTICS | Facility: HOSPITAL | Age: 29
End: 2024-09-06
Payer: COMMERCIAL

## 2024-09-06 ENCOUNTER — APPOINTMENT (EMERGENCY)
Dept: RADIOLOGY | Facility: HOSPITAL | Age: 29
End: 2024-09-06
Payer: COMMERCIAL

## 2024-09-06 ENCOUNTER — HOSPITAL ENCOUNTER (EMERGENCY)
Facility: HOSPITAL | Age: 29
Discharge: HOME | End: 2024-09-06
Attending: EMERGENCY MEDICINE
Payer: COMMERCIAL

## 2024-09-06 VITALS
HEART RATE: 80 BPM | DIASTOLIC BLOOD PRESSURE: 63 MMHG | TEMPERATURE: 98 F | RESPIRATION RATE: 20 BRPM | OXYGEN SATURATION: 96 % | SYSTOLIC BLOOD PRESSURE: 135 MMHG

## 2024-09-06 DIAGNOSIS — R07.89 ATYPICAL CHEST PAIN: Primary | ICD-10-CM

## 2024-09-06 LAB
ANION GAP SERPL CALCULATED.3IONS-SCNC: 8 MMOL/L (ref 4–13)
ATRIAL RATE: 86 BPM
B-HCG SERPL-ACNC: <0.6 MIU/ML (ref 0–5)
BASOPHILS # BLD AUTO: 0.04 THOUSANDS/ÂΜL (ref 0–0.1)
BASOPHILS NFR BLD AUTO: 1 % (ref 0–1)
BUN SERPL-MCNC: 9 MG/DL (ref 5–25)
CALCIUM SERPL-MCNC: 9.4 MG/DL (ref 8.4–10.2)
CHLORIDE SERPL-SCNC: 105 MMOL/L (ref 96–108)
CO2 SERPL-SCNC: 25 MMOL/L (ref 21–32)
CREAT SERPL-MCNC: 0.57 MG/DL (ref 0.6–1.3)
D DIMER PPP FEU-MCNC: 0.69 UG/ML FEU
EOSINOPHIL # BLD AUTO: 0.2 THOUSAND/ÂΜL (ref 0–0.61)
EOSINOPHIL NFR BLD AUTO: 3 % (ref 0–6)
ERYTHROCYTE [DISTWIDTH] IN BLOOD BY AUTOMATED COUNT: 12.7 % (ref 11.6–15.1)
GFR SERPL CREATININE-BSD FRML MDRD: 123 ML/MIN/1.73SQ M
GLUCOSE SERPL-MCNC: 105 MG/DL (ref 65–140)
HCT VFR BLD AUTO: 41.4 % (ref 34.8–46.1)
HGB BLD-MCNC: 13.7 G/DL (ref 11.5–15.4)
IMM GRANULOCYTES # BLD AUTO: 0.01 THOUSAND/UL (ref 0–0.2)
IMM GRANULOCYTES NFR BLD AUTO: 0 % (ref 0–2)
LYMPHOCYTES # BLD AUTO: 1.62 THOUSANDS/ÂΜL (ref 0.6–4.47)
LYMPHOCYTES NFR BLD AUTO: 20 % (ref 14–44)
MAGNESIUM SERPL-MCNC: 2 MG/DL (ref 1.9–2.7)
MCH RBC QN AUTO: 29.8 PG (ref 26.8–34.3)
MCHC RBC AUTO-ENTMCNC: 33.1 G/DL (ref 31.4–37.4)
MCV RBC AUTO: 90 FL (ref 82–98)
MONOCYTES # BLD AUTO: 0.87 THOUSAND/ÂΜL (ref 0.17–1.22)
MONOCYTES NFR BLD AUTO: 11 % (ref 4–12)
NEUTROPHILS # BLD AUTO: 5.22 THOUSANDS/ÂΜL (ref 1.85–7.62)
NEUTS SEG NFR BLD AUTO: 65 % (ref 43–75)
NRBC BLD AUTO-RTO: 0 /100 WBCS
P AXIS: 54 DEGREES
PLATELET # BLD AUTO: 408 THOUSANDS/UL (ref 149–390)
PMV BLD AUTO: 8.8 FL (ref 8.9–12.7)
POTASSIUM SERPL-SCNC: 4 MMOL/L (ref 3.5–5.3)
PR INTERVAL: 148 MS
QRS AXIS: 36 DEGREES
QRSD INTERVAL: 82 MS
QT INTERVAL: 364 MS
QTC INTERVAL: 435 MS
RBC # BLD AUTO: 4.6 MILLION/UL (ref 3.81–5.12)
SODIUM SERPL-SCNC: 138 MMOL/L (ref 135–147)
T WAVE AXIS: 39 DEGREES
TSH SERPL DL<=0.05 MIU/L-ACNC: 1.57 UIU/ML (ref 0.45–4.5)
VENTRICULAR RATE: 86 BPM
WBC # BLD AUTO: 7.96 THOUSAND/UL (ref 4.31–10.16)

## 2024-09-06 PROCEDURE — 84443 ASSAY THYROID STIM HORMONE: CPT | Performed by: EMERGENCY MEDICINE

## 2024-09-06 PROCEDURE — 80048 BASIC METABOLIC PNL TOTAL CA: CPT | Performed by: EMERGENCY MEDICINE

## 2024-09-06 PROCEDURE — 93970 EXTREMITY STUDY: CPT | Performed by: SURGERY

## 2024-09-06 PROCEDURE — 84702 CHORIONIC GONADOTROPIN TEST: CPT | Performed by: EMERGENCY MEDICINE

## 2024-09-06 PROCEDURE — 93970 EXTREMITY STUDY: CPT

## 2024-09-06 PROCEDURE — 74174 CTA ABD&PLVS W/CONTRAST: CPT

## 2024-09-06 PROCEDURE — 36415 COLL VENOUS BLD VENIPUNCTURE: CPT | Performed by: EMERGENCY MEDICINE

## 2024-09-06 PROCEDURE — 85379 FIBRIN DEGRADATION QUANT: CPT | Performed by: EMERGENCY MEDICINE

## 2024-09-06 PROCEDURE — 93005 ELECTROCARDIOGRAM TRACING: CPT

## 2024-09-06 PROCEDURE — 71045 X-RAY EXAM CHEST 1 VIEW: CPT

## 2024-09-06 PROCEDURE — 83735 ASSAY OF MAGNESIUM: CPT | Performed by: EMERGENCY MEDICINE

## 2024-09-06 PROCEDURE — 99285 EMERGENCY DEPT VISIT HI MDM: CPT | Performed by: EMERGENCY MEDICINE

## 2024-09-06 PROCEDURE — 99285 EMERGENCY DEPT VISIT HI MDM: CPT

## 2024-09-06 PROCEDURE — 93010 ELECTROCARDIOGRAM REPORT: CPT | Performed by: INTERNAL MEDICINE

## 2024-09-06 PROCEDURE — 71275 CT ANGIOGRAPHY CHEST: CPT

## 2024-09-06 PROCEDURE — 85025 COMPLETE CBC W/AUTO DIFF WBC: CPT | Performed by: EMERGENCY MEDICINE

## 2024-09-06 RX ADMIN — IOHEXOL 100 ML: 350 INJECTION, SOLUTION INTRAVENOUS at 14:10

## 2024-09-06 NOTE — ED PROVIDER NOTES
History  Chief Complaint   Patient presents with    Chest Pain     Patient comes in with intermittent midsternal chest pain that has been going on for the past 2 years.Endorses that the episodes are more frequent   Patient follows with cardiology.      HPI      29-year-old white female history anxiety, ADHD, depression, hypertension, junctional tachycardia, obesity, prior appendectomy, , presents the emergency department with a chief complaint of intermittent midsternal chest pain going on for approximately 2 years.  Review the chart noted that the beginning of the last month in August patient canceled the cardiology an appointment: Due to a issue with her ex significant other in which she is exploring a PFA against that individual.  Travel, no abdominal pain, no food intolerances, no right upper quadrant abdominal pain, no right scapular pain, postprandial pain, no diaphoresis, no specific pattern to the chest discomfort which is substernal nonradiating, no history of exertional symptoms noted, no presyncopal symptoms.    She denies any illicit drug use or heavy alcohol consumption but does elicit a history of palpitations at times.    Prior cardiac workup: Echo: 2018: [facility]: Normal EF, no aortic stenosis.    Remaining 12 point review of systems is unremarkable.  Prior to Admission Medications   Prescriptions Last Dose Informant Patient Reported? Taking?   FLUoxetine (PROzac) 20 mg capsule   No No   Sig: TAKE 1 CAPSULE BY MOUTH EVERY DAY   flecainide (TAMBOCOR) 50 mg tablet   No No   Sig: TAKE 1 TABLET BY MOUTH THREE TIMES A DAY      Facility-Administered Medications: None       Past Medical History:   Diagnosis Date    ADHD     Allergic     Anxiety     Depression     Hidradenitis suppurativa     Hypertension     Junctional tachycardia     Obesity        Past Surgical History:   Procedure Laterality Date    APPENDECTOMY       SECTION      WISDOM TOOTH  EXTRACTION      all 4       Family History   Adopted: Yes   Problem Relation Age of Onset    Asthma Mother     Depression Mother     Anxiety disorder Mother     Bipolar disorder Mother     Atrial fibrillation Father     Bipolar disorder Father     Psoriasis Father     Diabetes Sister     ADD / ADHD Sister     Anxiety disorder Sister     Valvular heart disease Brother     ADD / ADHD Brother     Bipolar disorder Brother     Suicide Attempts Brother      I have reviewed and agree with the history as documented.    E-Cigarette/Vaping    E-Cigarette Use Current Every Day User     Comments vape      E-Cigarette/Vaping Substances    Nicotine Yes     THC No     CBD No     Flavoring Yes     Other No     Unknown No      Social History     Tobacco Use    Smoking status: Former     Current packs/day: 0.00     Average packs/day: 0.3 packs/day for 10.0 years (2.5 ttl pk-yrs)     Types: Cigarettes     Start date: 2011     Quit date: 2021     Years since quitting: 3.6    Smokeless tobacco: Never   Vaping Use    Vaping status: Every Day    Substances: Nicotine, Flavoring   Substance Use Topics    Alcohol use: Yes     Comment: Rarely    Drug use: Not Currently       Review of Systems   Constitutional: Negative.    HENT: Negative.     Eyes: Negative.    Respiratory: Negative.  Negative for apnea, cough, choking, chest tightness, shortness of breath, wheezing and stridor.    Cardiovascular:  Positive for chest pain. Negative for palpitations and leg swelling.   Gastrointestinal: Negative.  Negative for abdominal pain.   Genitourinary: Negative.    Musculoskeletal: Negative.    Skin: Negative.    Allergic/Immunologic: Negative.    Neurological: Negative.    Psychiatric/Behavioral: Negative.         Physical Exam  Physical Exam  Nursing note reviewed.   Constitutional:       General: She is not in acute distress.     Appearance: She is well-developed and normal weight. She is not ill-appearing, toxic-appearing or diaphoretic.   HENT:       Head: Normocephalic and atraumatic.   Eyes:      Extraocular Movements: Extraocular movements intact.      Pupils: Pupils are equal, round, and reactive to light.   Cardiovascular:      Rate and Rhythm: Normal rate and regular rhythm.      Heart sounds: Normal heart sounds.   Pulmonary:      Breath sounds: Normal breath sounds.   Abdominal:      General: Bowel sounds are normal.      Palpations: Abdomen is soft.   Musculoskeletal:         General: Normal range of motion.      Cervical back: Normal range of motion and neck supple.      Right lower leg: No tenderness. No edema.      Left lower leg: No tenderness. No edema.   Skin:     General: Skin is warm.      Capillary Refill: Capillary refill takes less than 2 seconds.   Neurological:      General: No focal deficit present.      Mental Status: She is alert and oriented to person, place, and time.   Psychiatric:         Mood and Affect: Mood normal.         Behavior: Behavior normal.         Vital Signs  ED Triage Vitals   Temperature Pulse Respirations Blood Pressure SpO2   09/06/24 1219 09/06/24 1219 09/06/24 1219 09/06/24 1219 09/06/24 1219   98 °F (36.7 °C) 92 18 (!) 175/84 98 %      Temp src Heart Rate Source Patient Position - Orthostatic VS BP Location FiO2 (%)   -- 09/06/24 1300 09/06/24 1300 09/06/24 1300 --    Monitor Lying Right arm       Pain Score       09/06/24 1219       No Pain           Vitals:    09/06/24 1300 09/06/24 1400 09/06/24 1431 09/06/24 1500   BP: 139/65 141/70 128/78 135/63   Pulse: 80 77 81 80   Patient Position - Orthostatic VS: Lying  Lying Lying         Visual Acuity      ED Medications  Medications   iohexol (OMNIPAQUE) 350 MG/ML injection (MULTI-DOSE) 100 mL (100 mL Intravenous Given 9/6/24 1410)       Diagnostic Studies  Results Reviewed       Procedure Component Value Units Date/Time    D-Dimer [[id number]]  (Abnormal) Collected: 09/06/24 1253    Lab Status: Final result Specimen: Blood from Arm, Left Updated: 09/06/24 4931      D-Dimer, Quant 0.69 ug/ml FEU     TSH, 3rd generation with Free T4 reflex [[id number]]  (Normal) Collected: 09/06/24 1253    Lab Status: Final result Specimen: Blood from Arm, Left Updated: 09/06/24 1333     TSH 3RD GENERATON 1.567 uIU/mL     Quantitative hCG [[id number]]  (Normal) Collected: 09/06/24 1253    Lab Status: Final result Specimen: Blood from Arm, Left Updated: 09/06/24 1333     HCG, Quant <0.6 mIU/mL     Narrative:       Expected Ranges:    HCG results between 5.0 and 25.0 mIU/mL may be indicative of early pregnancy but should be interpreted in light of the total clinical presentation.    HCG can rise to detectable levels in enid and post menopausal women (0-11.6 mIU/mL).     Approximate               Approximate HCG  Gestation age          Concentration ( mIU/mL)  _____________          ______________________   Weeks                      HCG values  0.2-1                       5-50  1-2                           2-3                         100-5000  3-4                         500-81839  4-5                         1000-31228  5-6                         [id number]  6-8                         [id number]  8-12                        [id number]      CBC and differential [[id number]]  (Abnormal) Collected: 09/06/24 1253    Lab Status: Final result Specimen: Blood from Arm, Left Updated: 09/06/24 1318     WBC 7.96 Thousand/uL      RBC 4.60 Million/uL      Hemoglobin 13.7 g/dL      Hematocrit 41.4 %      MCV 90 fL      MCH 29.8 pg      MCHC 33.1 g/dL      RDW 12.7 %      MPV 8.8 fL      Platelets 408 Thousands/uL      nRBC 0 /100 WBCs      Segmented % 65 %      Immature Grans % 0 %      Lymphocytes % 20 %      Monocytes % 11 %      Eosinophils Relative 3 %      Basophils Relative 1 %      Absolute Neutrophils 5.22 Thousands/µL      Absolute Immature Grans 0.01 Thousand/uL      Absolute Lymphocytes 1.62 Thousands/µL      Absolute Monocytes 0.87 Thousand/µL      Eosinophils Absolute 0.20 Thousand/µL       Basophils Absolute 0.04 Thousands/µL     Basic metabolic panel [[id number]]  (Abnormal) Collected: 09/06/24 1253    Lab Status: Final result Specimen: Blood from Arm, Left Updated: 09/06/24 1318     Sodium 138 mmol/L      Potassium 4.0 mmol/L      Chloride 105 mmol/L      CO2 25 mmol/L      ANION GAP 8 mmol/L      BUN 9 mg/dL      Creatinine 0.57 mg/dL      Glucose 105 mg/dL      Calcium 9.4 mg/dL      eGFR 123 ml/min/1.73sq m     Narrative:      National Kidney Disease Foundation guidelines for Chronic Kidney Disease (CKD):     Stage 1 with normal or high GFR (GFR > 90 mL/min/1.73 square meters)    Stage 2 Mild CKD (GFR = 60-89 mL/min/1.73 square meters)    Stage 3A Moderate CKD (GFR = 45-59 mL/min/1.73 square meters)    Stage 3B Moderate CKD (GFR = 30-44 mL/min/1.73 square meters)    Stage 4 Severe CKD (GFR = 15-29 mL/min/1.73 square meters)    Stage 5 End Stage CKD (GFR <15 mL/min/1.73 square meters)  Note: GFR calculation is accurate only with a steady state creatinine    Magnesium [[id number]]  (Normal) Collected: 09/06/24 1253    Lab Status: Final result Specimen: Blood from Arm, Left Updated: 09/06/24 1318     Magnesium 2.0 mg/dL                    CTA dissection protocol chest abdomen pelvis w wo contrast   Final Result by London Blevins MD (09/06 1502)      No acute findings in the chest, abdomen or pelvis.               Workstation performed: [id number]         XR chest 1 view portable   ED Interpretation by Satinder Price III, DO (09/06 1305)   Portable chest x-ray shows no acute fractures, osseous abnormalities or occult pneumothoraces.      Final Result by Lobo Ceballos MD (09/06 1509)      No acute cardiopulmonary disease.            Resident: Jolene Barron I, the attending radiologist, have reviewed the images and agree with the final report above.      Workstation performed: [id number]          VAS VENOUS DUPLEX - LOWER LIMB BILATERAL    (Results Pending)               Procedures  ECG 12 Lead Documentation Only    Date/Time: 9/6/2024 12:23 PM    Performed by: Satinder Price III, DO  Authorized by: Satinder Price III, DO    Indications / Diagnosis:  Chest pain  ECG reviewed by me, the ED Provider: yes    Patient location:  ED  Comments:      I personally reviewed this EKG this performed on the patient September 6, 2024, EKG was completed at 12:24 PM interpreted by me the same time, normal sinus rhythm with baseline artifact noted otherwise ventricular rate is 86 bpm, remaining portion of all is within normal limits.    When compared to prior tracings as of July 24, 2023, at that time the patient had nonspecific ST abnormalities in the anterolateral leads were to currently not present on today's tracing.  No diffuse elevations to indicate pericarditis.  No coved ST elevations greater than 2mm with negative T waves in V1-3 to indicate concern for brugada.  No biphasic T waves in V2, V3 to indicate Wellens (critical stenosis of LAD).   No elevation in aVR or deviation when compared to V1 (can be associated with ST depression in I,II, V4-6 when left main occlusion is present).            ED Course  ED Course as of 09/06/24 1601   Fri Sep 06, 2024   1240 Patient seen and evaluated, Orders placed, 2-year history of intermittent chest pain, not associated with exertion, no calf pain, no recent car travel, no PE risk factors, seen by cardiology for prior history of junctional tachycardia, no GI symptoms or food intolerance.    Brief focused differential dx in this patient is as follows: Atypical chest pain versus noncardiac chest pain versus PE patient's heart rate was in the upper 90s on my assessment.  Low risk PERC score, low risk Warren score, low risk Wells criteria.   1406 The patient made aware of the D-dimer that could not be age-adjusted, patient since she is having only chest pain complaints with a normal pulse ox and a normal heart rate will proceed with a  "CTA of the chest to rule out dissection.   1536 Per vascular tech, venous Doppler in the lower extremity bilaterally negative.   1559 Workup negative, patient stable for discharge follow-up with cardiology.                                 SBIRT 22yo+      Flowsheet Row Most Recent Value   Initial Alcohol Screen: US AUDIT-C     1. How often do you have a drink containing alcohol? 0 Filed at: 09/06/2024 1219   2. How many drinks containing alcohol do you have on a typical day you are drinking?  0 Filed at: 09/06/2024 1219   3a. Male UNDER 65: How often do you have five or more drinks on one occasion? 0 Filed at: 09/06/2024 1219   3b. FEMALE Any Age, or MALE 65+: How often do you have 4 or more drinks on one occassion? 0 Filed at: 09/06/2024 1219   Audit-C Score 0 Filed at: 09/06/2024 1219   CASSIDY: How many times in the past year have you...    Used an illegal drug or used a prescription medication for non-medical reasons? Never Filed at: 09/06/2024 1219                      Medical Decision Making  Workup unremarkable, assurance given to the patient, patient stable for discharge follow-up with cardiology as an outpatient.    No radiation to the back. No tearing pain going to the back. Normal bilat UE pulses.   No pleuritic pain. No history of PE. No new unilateral leg swelling.  Non exertional. No sweats. No right sided pain. No vomiting.   No fever or cough to suggest pneumonia.   No  vomiting or subcue crepitus.   Equal breath sounds.   No skin rash.   No syncope.    No new murmur to suggest symptomatic valvular stenosis/ regurg or ruptured chordae.     Vitals do not suggest tamponade.   No palpable crepitus on exam.   No recent viral syndromes to suggest Terri/Myocarditis.    Portions of the record may have been created with voice recognition software. Occasional wrong word or \"sound a like\" substitutions may have occurred due to the inherent limitations of voice recognition software. Read the chart carefully and " recognize, using context, where substitutions have occurred.            Amount and/or Complexity of Data Reviewed  Labs: ordered.  Radiology: ordered and independent interpretation performed.    Risk  Prescription drug management.                 Disposition  Final diagnoses:   Atypical chest pain     Time reflects when diagnosis was documented in both MDM as applicable and the Disposition within this note       Time User Action Codes Description Comment    9/6/2024  3:39 PM Satinder Price Add [R07.89] Atypical chest pain           ED Disposition       ED Disposition   Discharge    Condition   Stable    Date/Time   Fri Sep 6, 2024 1508    Comment   Sonia Longo discharge to home/self care.                   Follow-up Information       Follow up With Specialties Details Why Contact Info    [facility], Nurse Practitioner   [address] [facility] [address]  [facility] [address]  [phone]      Randall Smith MD Cardiology   [address] [place] [id number]  [phone]              Discharge Medication List as of 9/6/2024  3:39 PM        CONTINUE these medications which have NOT CHANGED    Details   flecainide (TAMBOCOR) 50 mg tablet TAKE 1 TABLET BY MOUTH THREE TIMES A DAY, Starting Fri 11/17/2023, Normal      FLUoxetine (PROzac) 20 mg capsule TAKE 1 CAPSULE BY MOUTH EVERY DAY, Starting Fri 12/15/2023, Normal             No discharge procedures on file.    PDMP Review       None            ED Provider  Electronically Signed by             Satinder Price III, DO  09/06/24 4001

## 2024-12-09 ENCOUNTER — OFFICE VISIT (OUTPATIENT)
Dept: FAMILY MEDICINE CLINIC | Facility: CLINIC | Age: 29
End: 2024-12-09
Payer: COMMERCIAL

## 2024-12-09 ENCOUNTER — APPOINTMENT (OUTPATIENT)
Dept: LAB | Facility: CLINIC | Age: 29
End: 2024-12-09
Payer: COMMERCIAL

## 2024-12-09 VITALS
HEIGHT: 63 IN | WEIGHT: 254 LBS | SYSTOLIC BLOOD PRESSURE: 146 MMHG | HEART RATE: 80 BPM | DIASTOLIC BLOOD PRESSURE: 84 MMHG | BODY MASS INDEX: 45 KG/M2 | RESPIRATION RATE: 20 BRPM | TEMPERATURE: 98.6 F | OXYGEN SATURATION: 98 %

## 2024-12-09 DIAGNOSIS — I10 ESSENTIAL HYPERTENSION: Chronic | ICD-10-CM

## 2024-12-09 DIAGNOSIS — Z23 ENCOUNTER FOR IMMUNIZATION: ICD-10-CM

## 2024-12-09 DIAGNOSIS — Z00.00 ANNUAL PHYSICAL EXAM: Chronic | ICD-10-CM

## 2024-12-09 DIAGNOSIS — Z13.29 SCREENING FOR THYROID DISORDER: Chronic | ICD-10-CM

## 2024-12-09 DIAGNOSIS — E66.01 CLASS 2 SEVERE OBESITY DUE TO EXCESS CALORIES WITH SERIOUS COMORBIDITY AND BODY MASS INDEX (BMI) OF 36.0 TO 36.9 IN ADULT (HCC): ICD-10-CM

## 2024-12-09 DIAGNOSIS — Z00.00 ANNUAL PHYSICAL EXAM: Primary | Chronic | ICD-10-CM

## 2024-12-09 DIAGNOSIS — Z13.31 POSITIVE DEPRESSION SCREENING: ICD-10-CM

## 2024-12-09 DIAGNOSIS — Z13.1 SCREENING FOR DIABETES MELLITUS: Chronic | ICD-10-CM

## 2024-12-09 DIAGNOSIS — Z13.220 SCREENING FOR HYPERLIPIDEMIA: Chronic | ICD-10-CM

## 2024-12-09 DIAGNOSIS — K59.09 CHRONIC CONSTIPATION: Chronic | ICD-10-CM

## 2024-12-09 DIAGNOSIS — I47.19 JUNCTIONAL TACHYCARDIA (HCC): Chronic | ICD-10-CM

## 2024-12-09 DIAGNOSIS — R07.89 OTHER CHEST PAIN: Chronic | ICD-10-CM

## 2024-12-09 DIAGNOSIS — R53.82 CHRONIC FATIGUE: Chronic | ICD-10-CM

## 2024-12-09 DIAGNOSIS — Z12.4 SCREENING FOR CERVICAL CANCER: ICD-10-CM

## 2024-12-09 DIAGNOSIS — F41.1 GAD (GENERALIZED ANXIETY DISORDER): Chronic | ICD-10-CM

## 2024-12-09 DIAGNOSIS — Z23 NEED FOR COVID-19 VACCINE: ICD-10-CM

## 2024-12-09 DIAGNOSIS — E66.812 CLASS 2 SEVERE OBESITY DUE TO EXCESS CALORIES WITH SERIOUS COMORBIDITY AND BODY MASS INDEX (BMI) OF 36.0 TO 36.9 IN ADULT (HCC): ICD-10-CM

## 2024-12-09 DIAGNOSIS — E55.9 VITAMIN D DEFICIENCY: Chronic | ICD-10-CM

## 2024-12-09 PROBLEM — O40.3XX0 POLYHYDRAMNIOS AFFECTING PREGNANCY IN THIRD TRIMESTER: Status: ACTIVE | Noted: 2018-11-27

## 2024-12-09 PROBLEM — O26.86 PUPP (PRURITIC URTICARIAL PAPULES AND PLAQUES OF PREGNANCY): Status: ACTIVE | Noted: 2018-11-27

## 2024-12-09 PROBLEM — O24.419 GESTATIONAL DIABETES MELLITUS: Status: ACTIVE | Noted: 2024-12-09

## 2024-12-09 PROBLEM — O14.90 PRE-ECLAMPSIA: Status: ACTIVE | Noted: 2018-12-26

## 2024-12-09 LAB
25(OH)D3 SERPL-MCNC: 9.9 NG/ML (ref 30–100)
ALBUMIN SERPL BCG-MCNC: 4.3 G/DL (ref 3.5–5)
ALP SERPL-CCNC: 81 U/L (ref 34–104)
ALT SERPL W P-5'-P-CCNC: 25 U/L (ref 7–52)
ANION GAP SERPL CALCULATED.3IONS-SCNC: 11 MMOL/L (ref 4–13)
AST SERPL W P-5'-P-CCNC: 17 U/L (ref 13–39)
BASOPHILS # BLD AUTO: 0.04 THOUSANDS/ÂΜL (ref 0–0.1)
BASOPHILS NFR BLD AUTO: 1 % (ref 0–1)
BILIRUB SERPL-MCNC: 0.61 MG/DL (ref 0.2–1)
BUN SERPL-MCNC: 7 MG/DL (ref 5–25)
CALCIUM SERPL-MCNC: 9.5 MG/DL (ref 8.4–10.2)
CHLORIDE SERPL-SCNC: 103 MMOL/L (ref 96–108)
CHOLEST SERPL-MCNC: 193 MG/DL (ref ?–200)
CO2 SERPL-SCNC: 25 MMOL/L (ref 21–32)
CREAT SERPL-MCNC: 0.52 MG/DL (ref 0.6–1.3)
EOSINOPHIL # BLD AUTO: 0.42 THOUSAND/ÂΜL (ref 0–0.61)
EOSINOPHIL NFR BLD AUTO: 5 % (ref 0–6)
ERYTHROCYTE [DISTWIDTH] IN BLOOD BY AUTOMATED COUNT: 13.3 % (ref 11.6–15.1)
EST. AVERAGE GLUCOSE BLD GHB EST-MCNC: 108 MG/DL
GFR SERPL CREATININE-BSD FRML MDRD: 127 ML/MIN/1.73SQ M
GLUCOSE P FAST SERPL-MCNC: 84 MG/DL (ref 65–99)
HBA1C MFR BLD: 5.4 %
HCT VFR BLD AUTO: 42.6 % (ref 34.8–46.1)
HDLC SERPL-MCNC: 40 MG/DL
HGB BLD-MCNC: 13.7 G/DL (ref 11.5–15.4)
IMM GRANULOCYTES # BLD AUTO: 0.05 THOUSAND/UL (ref 0–0.2)
IMM GRANULOCYTES NFR BLD AUTO: 1 % (ref 0–2)
LDLC SERPL CALC-MCNC: 115 MG/DL (ref 0–100)
LYMPHOCYTES # BLD AUTO: 1.8 THOUSANDS/ÂΜL (ref 0.6–4.47)
LYMPHOCYTES NFR BLD AUTO: 22 % (ref 14–44)
MCH RBC QN AUTO: 29.5 PG (ref 26.8–34.3)
MCHC RBC AUTO-ENTMCNC: 32.2 G/DL (ref 31.4–37.4)
MCV RBC AUTO: 92 FL (ref 82–98)
MONOCYTES # BLD AUTO: 0.73 THOUSAND/ÂΜL (ref 0.17–1.22)
MONOCYTES NFR BLD AUTO: 9 % (ref 4–12)
NEUTROPHILS # BLD AUTO: 5 THOUSANDS/ÂΜL (ref 1.85–7.62)
NEUTS SEG NFR BLD AUTO: 62 % (ref 43–75)
NONHDLC SERPL-MCNC: 153 MG/DL
NRBC BLD AUTO-RTO: 0 /100 WBCS
PLATELET # BLD AUTO: 380 THOUSANDS/UL (ref 149–390)
PMV BLD AUTO: 9.2 FL (ref 8.9–12.7)
POTASSIUM SERPL-SCNC: 4.2 MMOL/L (ref 3.5–5.3)
PROT SERPL-MCNC: 7.3 G/DL (ref 6.4–8.4)
RBC # BLD AUTO: 4.65 MILLION/UL (ref 3.81–5.12)
SODIUM SERPL-SCNC: 139 MMOL/L (ref 135–147)
TRIGL SERPL-MCNC: 191 MG/DL (ref ?–150)
TSH SERPL DL<=0.05 MIU/L-ACNC: 1.11 UIU/ML (ref 0.45–4.5)
WBC # BLD AUTO: 8.04 THOUSAND/UL (ref 4.31–10.16)

## 2024-12-09 PROCEDURE — 99395 PREV VISIT EST AGE 18-39: CPT | Performed by: NURSE PRACTITIONER

## 2024-12-09 PROCEDURE — 82306 VITAMIN D 25 HYDROXY: CPT

## 2024-12-09 PROCEDURE — 36415 COLL VENOUS BLD VENIPUNCTURE: CPT

## 2024-12-09 PROCEDURE — 80053 COMPREHEN METABOLIC PANEL: CPT

## 2024-12-09 PROCEDURE — 84443 ASSAY THYROID STIM HORMONE: CPT

## 2024-12-09 PROCEDURE — 80061 LIPID PANEL: CPT

## 2024-12-09 PROCEDURE — 85025 COMPLETE CBC W/AUTO DIFF WBC: CPT

## 2024-12-09 PROCEDURE — 83036 HEMOGLOBIN GLYCOSYLATED A1C: CPT

## 2024-12-09 RX ORDER — POLYETHYLENE GLYCOL 3350 17 G/17G
17 POWDER, FOR SOLUTION ORAL DAILY
Qty: 510 G | Refills: 5 | Status: SHIPPED | OUTPATIENT
Start: 2024-12-09

## 2024-12-09 NOTE — ASSESSMENT & PLAN NOTE
This is a chronic disease process.   The patient is stable at this time.  We discussed diet and exercise.  We discussed a low salt diet.  Will monitor labs.  Patient was on metoprolol tartrate in the past after the birth of her child  She stopped this due to making her severely fatigue

## 2024-12-09 NOTE — ASSESSMENT & PLAN NOTE
This is a chronic disease process.   The patient is stable at this time.  We discussed diet and exercise.  We discussed a low salt diet.  Will order echocardiogram  Referral to cardiology for second opinion  Will monitor labs.  Continue  Flecainide      Patient may benefit from echocardiogram  Orders:  •  Echo complete w/ contrast if indicated; Future  •  Ambulatory Referral to Cardiology; Future

## 2024-12-09 NOTE — ASSESSMENT & PLAN NOTE
Lifestyle modification, diet and exercise discussed    OBESITY    For most healthy adults, the U.S. Department of Health and Human Services recommends these exercise guidelines:  Aerobic activity.   Get at least 150 minutes of moderate aerobic activity or 75 minutes of vigorous aerobic activity a week, or a combination of moderate and vigorous activity.   The guidelines suggest that you spread out this exercise during the course of a week.   Greater amounts of exercise will provide even greater health benefit.   But even small amounts of physical activity are helpful.   Being active for short periods of time throughout the day can add up to provide health benefit.  Strength training.   Do strength training exercises for all major muscle groups at least two times a week.   Aim to do a single set of each exercise, using a weight or resistance level heavy enough to tire your muscles after about 12 to 15 repetitions.    Moderate aerobic exercise includes activities such as brisk walking, swimming and mowing the lawn.   Vigorous aerobic exercise includes activities such as running and aerobic dancing.   Strength training can include use of weight machines, your own body weight, resistance tubing or activities such as rock climbing.      Combined with healthy dietary choices and care with portion size, walking 10,000 steps per day will help you burn calories, which can result in weight loss.     Food tracker danii like: [employer] (I am not affiliated with this or any other apps for diet or weight loss)    Exercise danii like: [employer] (I am not affiliated with this or any other apps for diet or weight loss)    ______________________________________________________________________________    FDA guidelines recommend that weight-loss medicines may be considered for people who have tried lifestyle changes and meet one or more of the following conditions:   BMI equal or greater than 30.   BMI equal or greater than 27 with one  or more obesity-related conditions (like high blood pressure or diabetes).  Have not lost at least 5% of their total body weight in three to six months with lifestyle changes alone.    ______________________________________________________________________________    For even more health benefits, the guidelines suggest getting 300 minutes a week or more of moderate aerobic activity.   Exercising this much may help with weight loss or keeping off lost weight.   If you're trying to lose weight, you should aim for doing cardio at least five days per week for a total of at least 250 minutes (4 hours, 10 minutes) each week.   Contrary to what many believe, you can do aerobic exercise seven days per week.   If this goal seems daunting for you, start slow.     How do weight loss medications work?  There are many types of prescription weight-loss medicines that work differently to help with weight loss. Some are for short-term use (less than 12 weeks). Some can be used for longer periods of time. Many weight loss medications have side effects.  Prescription weight-loss medicines work in one or more of the following ways:  Decrease appetite  Increase feelings of fullness  Interfere with fat absorption    When should you start considering a weight loss medication?  FDA guidelines recommend that weight-loss medicines may be considered for people who have tried lifestyle changes and meet one or more of the following conditions:  BMI equal or greater than 30  BMI equal or greater than 27 with one or more obesity-related conditions (like high blood pressure or diabetes)  Have not lost at least 5% of their total body weight in three to six months with lifestyle changes alone  It's important for you to talk to your doctor about the risks and benefits of all weight-loss treatments. Weight-loss medications may not be the right treatment for everyone. Make sure your doctor knows your full medical history. Always talk to your doctor  before starting or stopping any medicine.    Any weight-loss medicine should be taken along with lifestyle changes, such as exercise and a healthy diet - not in place of them.        ___________________________    Is the patient of South , Southeast , or East  descent?  No   Does the patient have a diagnosis of obesity confirmed by a BMI greater than or equal to 25 kg/m^2?  Yes  Does the patient have a diagnosis of obesity confirmed by a BMI greater than or equal to 30 kg/m^2?  Yes   Does the patient have a BMI greater than or equal to 27 kg/m^2 with at least one weight-related comorbidity/risk factor/complication (e.g., diabetes, dyslipidemia, coronary artery disease?   Yes   Has the patient been on a weight loss regimen of a low-calorie diet, increased physical activity, and behavioral modifications for a minimum of 6 months prior to initiating therapy with the requested medication?   Yes  Did the patient achieve a weight loss of 1 pound or more per week while on the weight loss regimen prior to initiating therapy with the requested medication?  No   Is the patient currently on and will continue a weight loss regimen of a low-calorie diet, increased physical activity, and behavioral modifications?  Yes  Will the patient initiate or is currently on and will continue a weight loss regimen of a low-calorie diet, increased, physical activity, and behavioral modifications along with the requested medication?  Yes  Does the patient have a diagnosis of obesity, confirmed by a BMI greater than or equal to 95th percentile for age and gender?  Yes   Does the patient have a BMI greater than or equal to 85th percentile for age and gender AND at least one severe weight-related comorbidity/risk factor/complication?  Yes  Is the patient's age within FDA labeling for the requested diagnosis for the requested medication?  Yes  Has the prescriber provided information in support of using the requested medication for  the patient's age for the requested diagnosis?  Yes  Does the patient have any medical conditions, intolerances/hypersensitivities, or current medication use that prevent the patient from taking the requested medication (per FDA labeling)?  Yes  Will the patient be using the requested medication in combination with another targeted weight loss agent for the requested diagnosis?  No  Has the patient used a targeted weight loss agent in the past 12 months?  No  Does the prescriber anticipate that the patient will be successful with weight loss therapy with the requested medication?   Yes   Is the patient currently using the requested agent?  No  Has the patient achieved and maintained a weight loss from baseline (prior to initiation of the requested agent)?   No  Is the patient newly starting therapy?  Yes  Has the patient used a weight loss agent in the past 12 months for a previous course of therapy?  No   Orders:  •  semaglutide (Rybelsus) 3 MG tablet; Take 1 tablet (3 mg total) by mouth daily before breakfast

## 2024-12-09 NOTE — ASSESSMENT & PLAN NOTE
Orders:  •  polyethylene glycol (MIRALAX) 17 g packet; Take 17 g by mouth daily  •  Ambulatory Referral to Cardiology; Future

## 2024-12-09 NOTE — ASSESSMENT & PLAN NOTE
Orders:  •  Comprehensive metabolic panel; Future  •  CBC and differential; Future  •  Comprehensive metabolic panel; Future  •  CBC and differential; Future

## 2024-12-09 NOTE — PATIENT INSTRUCTIONS
"_________________________________________________________________  YOU ARE DUE FOR YOUR NEXT ANNUAL PHYSICAL EXAM AFTER 12/9/2025.  REPEAT LABS ORDERED, PLEASE HAVE THEM DRAWN THE WEEK PRIOR TO YOUR VISIT (APPROXIMATELY 12/2/2025).  LABS HAVE BEEN ORDERED FOR YOU.   THESE LABS SHOULD BE DRAWN PRIOR TO YOUR NEXT VISIT.  YOU CAN HAVE THEM DRAWN UP TO 1 WEEK PRIOR TO YOUR NEXT VISIT.  HAVING YOUR BLOOD WORK WHEN YOU COME TO YOUR NEXT VISIT WILL ALLOW ME TO PROVIDE THE BEST MEDICAL CARE FOR YOU WITHOUT HAVING EITHER OF US PERFORM MORE WORK THAN NECESSARY.  PLEASE BE COMPLIANT WITH THIS REQUEST.   _____________________________________________________________________  Patient Education     Routine physical for adults   The Basics   Written by the doctors and editors at [facility]   What is a physical? -- A physical is a routine visit, or \"check-up,\" with your doctor. You might also hear it called a \"wellness visit\" or \"preventive visit.\"  During each visit, the doctor will:   Ask about your physical and mental health   Ask about your habits, behaviors, and lifestyle   Do an exam   Give you vaccines if needed   Talk to you about any medicines you take   Give advice about your health   Answer your questions  Getting regular check-ups is an important part of taking care of your health. It can help your doctor find and treat any problems you have. But it's also important for preventing health problems.  A routine physical is different from a \"sick visit.\" A sick visit is when you see a doctor because of a health concern or problem. Since physicals are scheduled ahead of time, you can think about what you want to ask the doctor.  How often should I get a physical? -- It depends on your age and health. In general, for people age 21 years and older:   If you are younger than 50 years, you might be able to get a physical every 3 years.   If you are 50 years or older, your doctor might recommend a physical every year.  If you have " "an ongoing health condition, like diabetes or high blood pressure, your doctor will probably want to see you more often.  What happens during a physical? -- In general, each visit will include:   Physical exam - The doctor or nurse will check your height, weight, heart rate, and blood pressure. They will also look at your eyes and ears. They will ask about how you are feeling and whether you have any symptoms that bother you.   Medicines - It's a good idea to bring a list of all the medicines you take to each doctor visit. Your doctor will talk to you about your medicines and answer any questions. Tell them if you are having any side effects that bother you. You should also tell them if you are having trouble paying for any of your medicines.   Habits and behaviors - This includes:   Your diet   Your exercise habits   Whether you smoke, drink alcohol, or use drugs   Whether you are sexually active   Whether you feel safe at home  Your doctor will talk to you about things you can do to improve your health and lower your risk of health problems. They will also offer help and support. For example, if you want to quit smoking, they can give you advice and might prescribe medicines. If you want to improve your diet or get more physical activity, they can help you with this, too.   Lab tests, if needed - The tests you get will depend on your age and situation. For example, your doctor might want to check your:   Cholesterol   Blood sugar   Iron level   Vaccines - The recommended vaccines will depend on your age, health, and what vaccines you already had. Vaccines are very important because they can prevent certain serious or deadly infections.   Discussion of screening - \"Screening\" means checking for diseases or other health problems before they cause symptoms. Your doctor can recommend screening based on your age, risk, and preferences. This might include tests to check for:   Cancer, such as breast, prostate, cervical, " ovarian, colorectal, prostate, lung, or skin cancer   Sexually transmitted infections, such as chlamydia and gonorrhea   Mental health conditions like depression and anxiety  Your doctor will talk to you about the different types of screening tests. They can help you decide which screenings to have. They can also explain what the results might mean.   Answering questions - The physical is a good time to ask the doctor or nurse questions about your health. If needed, they can refer you to other doctors or specialists, too.  Adults older than 65 years often need other care, too. As you get older, your doctor will talk to you about:   How to prevent falling at home   Hearing or vision tests   Memory testing   How to take your medicines safely   Making sure that you have the help and support you need at home  All topics are updated as new evidence becomes available and our peer review process is complete.  This topic retrieved from [employer] on: May 02, 2024.  Topic 169897 Version 1.0  Release: 32.4.3 - C32.122  © 2024 UpToDate, Inc. and/or its affiliates. All rights reserved.  Consumer Information Use and Disclaimer   Disclaimer: This generalized information is a limited summary of diagnosis, treatment, and/or medication information. It is not meant to be comprehensive and should be used as a tool to help the user understand and/or assess potential diagnostic and treatment options. It does NOT include all information about conditions, treatments, medications, side effects, or risks that may apply to a specific patient. It is not intended to be medical advice or a substitute for the medical advice, diagnosis, or treatment of a health care provider based on the health care provider's examination and assessment of a patient's specific and unique circumstances. Patients must speak with a health care provider for complete information about their health, medical questions, and treatment options, including any risks or  benefits regarding use of medications. This information does not endorse any treatments or medications as safe, effective, or approved for treating a specific patient. UpToDate, Inc. and its affiliates disclaim any warranty or liability relating to this information or the use thereof.The use of this information is governed by the Terms of Use, available at https://www.[employer].com/en/know/clinical-effectiveness-terms. 2024© UpToDate, Inc. and its affiliates and/or licensors. All rights reserved.  Copyright   © 2024 UpToDate, Inc. and/or its affiliates. All rights reserved.

## 2024-12-09 NOTE — ASSESSMENT & PLAN NOTE
Orders:  •  TSH, 3rd generation with Free T4 reflex; Future  •  TSH, 3rd generation with Free T4 reflex; Future

## 2024-12-09 NOTE — PROGRESS NOTES
Adult Annual Physical  Name: Sonia Longo      : 1995      MRN: [id number]  Encounter Provider: DEREK Mills  Encounter Date: 2024   Encounter department: [facility]    Assessment & Plan  Screening for cervical cancer    Orders:  •  Ambulatory referral to Obstetrics / Gynecology; Future    Encounter for immunization         Need for COVID-19 vaccine         Annual physical exam    Orders:  •  Comprehensive metabolic panel; Future  •  CBC and differential; Future  •  Comprehensive metabolic panel; Future  •  CBC and differential; Future    Class 2 severe obesity due to excess calories with serious comorbidity and body mass index (BMI) of 36.0 to 36.9 in adult (HCC)  Lifestyle modification, diet and exercise discussed    OBESITY    For most healthy adults, the [place] Department of Health and Human Services recommends these exercise guidelines:  Aerobic activity.   Get at least 150 minutes of moderate aerobic activity or 75 minutes of vigorous aerobic activity a week, or a combination of moderate and vigorous activity.   The guidelines suggest that you spread out this exercise during the course of a week.   Greater amounts of exercise will provide even greater health benefit.   But even small amounts of physical activity are helpful.   Being active for short periods of time throughout the day can add up to provide health benefit.  Strength training.   Do strength training exercises for all major muscle groups at least two times a week.   Aim to do a single set of each exercise, using a weight or resistance level heavy enough to tire your muscles after about 12 to 15 repetitions.    Moderate aerobic exercise includes activities such as brisk walking, swimming and mowing the lawn.   Vigorous aerobic exercise includes activities such as running and aerobic dancing.   Strength training can include use of weight machines, your own body weight, resistance tubing or  activities such as rock climbing.      Combined with healthy dietary choices and care with portion size, walking 10,000 steps per day will help you burn calories, which can result in weight loss.     Food tracker danii like: MyFitnessPal (I am not affiliated with this or any other apps for diet or weight loss)    Exercise danii like: [employer] (I am not affiliated with this or any other apps for diet or weight loss)    ______________________________________________________________________________    FDA guidelines recommend that weight-loss medicines may be considered for people who have tried lifestyle changes and meet one or more of the following conditions:   BMI equal or greater than 30.   BMI equal or greater than 27 with one or more obesity-related conditions (like high blood pressure or diabetes).  Have not lost at least 5% of their total body weight in three to six months with lifestyle changes alone.    ______________________________________________________________________________    For even more health benefits, the guidelines suggest getting 300 minutes a week or more of moderate aerobic activity.   Exercising this much may help with weight loss or keeping off lost weight.   If you're trying to lose weight, you should aim for doing cardio at least five days per week for a total of at least 250 minutes (4 hours, 10 minutes) each week.   Contrary to what many believe, you can do aerobic exercise seven days per week.   If this goal seems daunting for you, start slow.     How do weight loss medications work?  There are many types of prescription weight-loss medicines that work differently to help with weight loss. Some are for short-term use (less than 12 weeks). Some can be used for longer periods of time. Many weight loss medications have side effects.  Prescription weight-loss medicines work in one or more of the following ways:  Decrease appetite  Increase feelings of fullness  Interfere with fat  absorption    When should you start considering a weight loss medication?  FDA guidelines recommend that weight-loss medicines may be considered for people who have tried lifestyle changes and meet one or more of the following conditions:  BMI equal or greater than 30  BMI equal or greater than 27 with one or more obesity-related conditions (like high blood pressure or diabetes)  Have not lost at least 5% of their total body weight in three to six months with lifestyle changes alone  It's important for you to talk to your doctor about the risks and benefits of all weight-loss treatments. Weight-loss medications may not be the right treatment for everyone. Make sure your doctor knows your full medical history. Always talk to your doctor before starting or stopping any medicine.    Any weight-loss medicine should be taken along with lifestyle changes, such as exercise and a healthy diet - not in place of them.        ___________________________    Is the patient of South , Southeast , or East  descent?  No   Does the patient have a diagnosis of obesity confirmed by a BMI greater than or equal to 25 kg/m^2?  Yes  Does the patient have a diagnosis of obesity confirmed by a BMI greater than or equal to 30 kg/m^2?  Yes   Does the patient have a BMI greater than or equal to 27 kg/m^2 with at least one weight-related comorbidity/risk factor/complication (e.g., diabetes, dyslipidemia, coronary artery disease?   Yes   Has the patient been on a weight loss regimen of a low-calorie diet, increased physical activity, and behavioral modifications for a minimum of 6 months prior to initiating therapy with the requested medication?   Yes  Did the patient achieve a weight loss of 1 pound or more per week while on the weight loss regimen prior to initiating therapy with the requested medication?  No   Is the patient currently on and will continue a weight loss regimen of a low-calorie diet, increased physical activity,  and behavioral modifications?  Yes  Will the patient initiate or is currently on and will continue a weight loss regimen of a low-calorie diet, increased, physical activity, and behavioral modifications along with the requested medication?  Yes  Does the patient have a diagnosis of obesity, confirmed by a BMI greater than or equal to 95th percentile for age and gender?  Yes   Does the patient have a BMI greater than or equal to 85th percentile for age and gender AND at least one severe weight-related comorbidity/risk factor/complication?  Yes  Is the patient's age within FDA labeling for the requested diagnosis for the requested medication?  Yes  Has the prescriber provided information in support of using the requested medication for the patient's age for the requested diagnosis?  Yes  Does the patient have any medical conditions, intolerances/hypersensitivities, or current medication use that prevent the patient from taking the requested medication (per FDA labeling)?  Yes  Will the patient be using the requested medication in combination with another targeted weight loss agent for the requested diagnosis?  No  Has the patient used a targeted weight loss agent in the past 12 months?  No  Does the prescriber anticipate that the patient will be successful with weight loss therapy with the requested medication?   Yes   Is the patient currently using the requested agent?  No  Has the patient achieved and maintained a weight loss from baseline (prior to initiation of the requested agent)?   No  Is the patient newly starting therapy?  Yes  Has the patient used a weight loss agent in the past 12 months for a previous course of therapy?  No   Orders:  •  semaglutide (Rybelsus) 3 MG tablet; Take 1 tablet (3 mg total) by mouth daily before breakfast    LEE ANN (generalized anxiety disorder)    Orders:  •  FLUoxetine (PROzac) 20 mg capsule; Take 1 capsule (20 mg total) by mouth daily    Junctional tachycardia (HCC)  This is a  chronic disease process.   The patient is stable at this time.  We discussed diet and exercise.  We discussed a low salt diet.  Will order echocardiogram  Referral to cardiology for second opinion  Will monitor labs.  Continue  Flecainide      Patient may benefit from echocardiogram  Orders:  •  Echo complete w/ contrast if indicated; Future  •  Ambulatory Referral to Cardiology; Future    Vitamin D deficiency    Orders:  •  Vitamin D 25 hydroxy; Future  •  Vitamin D 25 hydroxy; Future    Screening for diabetes mellitus    Orders:  •  Hemoglobin A1C; Future  •  Hemoglobin A1C; Future    Screening for hyperlipidemia    Orders:  •  Lipid panel; Future  •  Lipid panel; Future    Screening for thyroid disorder    Orders:  •  TSH, 3rd generation with Free T4 reflex; Future  •  TSH, 3rd generation with Free T4 reflex; Future    Essential hypertension  This is a chronic disease process.   The patient is stable at this time.  We discussed diet and exercise.  We discussed a low salt diet.  Will monitor labs.  Patient was on metoprolol tartrate in the past after the birth of her child  She stopped this due to making her severely fatigue             Positive depression screening    Orders:  •  FLUoxetine (PROzac) 20 mg capsule; Take 1 capsule (20 mg total) by mouth daily    Chronic constipation    Orders:  •  polyethylene glycol (MIRALAX) 17 g packet; Take 17 g by mouth daily  •  Ambulatory Referral to Cardiology; Future    Other chest pain    Orders:  •  Ambulatory Referral to Cardiology; Future    Chronic fatigue    Orders:  •  Ambulatory Referral to Cardiology; Future      Immunizations and preventive care screenings were discussed with patient today. Appropriate education was printed on patient's after visit summary.    Counseling:  Alcohol/drug use: discussed moderation in alcohol intake, the recommendations for healthy alcohol use, and avoidance of illicit drug use.  Dental Health: discussed importance of regular tooth  brushing, flossing, and dental visits.  Injury prevention: discussed safety/seat belts, safety helmets, smoke detectors, carbon monoxide detectors, and smoking near bedding or upholstery.  Sexual health: discussed sexually transmitted diseases, partner selection, use of condoms, avoidance of unintended pregnancy, and contraceptive alternatives.  Exercise: the importance of regular exercise/physical activity was discussed. Recommend exercise 3-5 times per week for at least 30 minutes.          History of Present Illness     Adult Annual Physical:  Patient presents for annual physical.     Diet and Physical Activity:  - Diet/Nutrition: well balanced diet.  - Exercise: walking.    Depression Screening:  - PHQ-2 Score: 0  - PHQ-9 Score: 0    General Health:  - Sleep: sleeps well.  - Hearing: normal hearing bilateral ears.  - Vision: no vision problems.  - Dental: regular dental visits.    /GYN Health:  - Follows with GYN: yes.   - History of STDs: no    Advanced Care Planning:  - Has an advanced directive?: no    - Has a durable medical POA?: no    - ACP document given to patient?: no      Review of Systems   Constitutional:  Negative for activity change, chills, fatigue and fever.   HENT:  Negative for rhinorrhea and sore throat.    Eyes:  Negative for pain.   Respiratory:  Negative for cough and shortness of breath.    Cardiovascular:  Negative for chest pain, palpitations and leg swelling.   Gastrointestinal:  Positive for constipation. Negative for abdominal pain, diarrhea, nausea and vomiting.   Genitourinary:  Negative for difficulty urinating, flank pain, frequency and urgency.   Musculoskeletal:  Negative for gait problem, joint swelling and myalgias.   Skin:  Negative for color change.   Neurological:  Negative for dizziness, weakness, light-headedness and headaches.   Psychiatric/Behavioral:  Negative for sleep disturbance. The patient is nervous/anxious.    All other systems reviewed and are  "negative.    Current Outpatient Medications on File Prior to Visit   Medication Sig Dispense Refill   • flecainide (TAMBOCOR) 50 mg tablet TAKE 1 TABLET BY MOUTH THREE TIMES A  tablet 3     No current facility-administered medications on file prior to visit.        Objective   /84 (Patient Position: Sitting, Cuff Size: Large)   Pulse 80   Temp 98.6 °F (37 °C) (Tympanic)   Resp 20   Ht 5' 3\" (1.6 m)   Wt 115 kg (254 lb)   SpO2 98%   Breastfeeding No   BMI 44.99 kg/m²     Physical Exam  Vitals and nursing note reviewed.   Constitutional:       General: She is awake. She is not in acute distress.     Appearance: Normal appearance. She is well-developed.   HENT:      Head: Normocephalic and atraumatic.      Nose: Nose normal.      Mouth/Throat:      Mouth: Mucous membranes are moist.   Eyes:      Conjunctiva/sclera: Conjunctivae normal.   Cardiovascular:      Rate and Rhythm: Normal rate and regular rhythm.      Pulses: Normal pulses.      Heart sounds: Normal heart sounds. No murmur heard.  Pulmonary:      Effort: Pulmonary effort is normal. No respiratory distress.      Breath sounds: Normal breath sounds.   Abdominal:      General: Bowel sounds are normal.      Palpations: Abdomen is soft.      Tenderness: There is no abdominal tenderness.   Musculoskeletal:      Cervical back: Neck supple.      Right lower leg: No edema.      Left lower leg: No edema.   Skin:     General: Skin is warm and dry.   Neurological:      Mental Status: She is alert and oriented to person, place, and time.   Psychiatric:         Attention and Perception: Attention normal.         Mood and Affect: Mood is anxious and depressed.         Speech: Speech normal.         Behavior: Behavior normal. Behavior is cooperative.         Thought Content: Thought content normal.         Cognition and Memory: Cognition normal.         Judgment: Judgment normal.       Administrative Statements   I have spent a total time of 45 minutes " in caring for this patient on the day of the visit/encounter including Diagnostic results, Prognosis, Risks and benefits of tx options, Instructions for management, Patient and family education, Importance of tx compliance, Risk factor reductions, Impressions, Counseling / Coordination of care, Documenting in the medical record, Reviewing / ordering tests, medicine, procedures  , and Obtaining or reviewing history  . Topics discussed with the patient / family include symptom assessment and management, medication review, and psychosocial support.

## 2024-12-10 DIAGNOSIS — Z00.6 ENCOUNTER FOR EXAMINATION FOR NORMAL COMPARISON OR CONTROL IN CLINICAL RESEARCH PROGRAM: ICD-10-CM

## 2024-12-13 ENCOUNTER — OFFICE VISIT (OUTPATIENT)
Dept: FAMILY MEDICINE CLINIC | Facility: CLINIC | Age: 29
End: 2024-12-13
Payer: COMMERCIAL

## 2024-12-13 VITALS
DIASTOLIC BLOOD PRESSURE: 84 MMHG | TEMPERATURE: 99.1 F | OXYGEN SATURATION: 95 % | HEART RATE: 80 BPM | WEIGHT: 253 LBS | RESPIRATION RATE: 20 BRPM | SYSTOLIC BLOOD PRESSURE: 136 MMHG | HEIGHT: 63 IN | BODY MASS INDEX: 44.83 KG/M2

## 2024-12-13 DIAGNOSIS — R59.0 LYMPHADENOPATHY, SUBMANDIBULAR: Primary | ICD-10-CM

## 2024-12-13 PROCEDURE — 99213 OFFICE O/P EST LOW 20 MIN: CPT

## 2024-12-13 NOTE — PROGRESS NOTES
Name: Sonia oLngo      : 1995      MRN: [id number]  Encounter Provider: Hannah Whelan PA-C  Encounter Date: 2024   Encounter department: [facility]  :  Assessment & Plan  Lymphadenopathy, submandibular  -Unilateral mild lymphadenopathy noted to the right submandibular area  -Told patient that this is not a cause for concern and that it most likely is stemming from the cut in her nostril or the cheek piercing's that she had gotten.   -Told her that she could put a little bit of antibiotic ointment on a Q-tip and put it on the cut inside her nose  -She may also try some moist warm compresses on the area for slight discomfort  -Advised if it worsens, gets larger, or if she starts getting fevers, to come back for reevaluation.   -Most likely will resolve in a few days on its own.            History of Present Illness {?Quick Links Encounters * My Last Note * Last Note in Specialty * Snapshot * Since Last Visit * History :40331}    Patient is a 29-year-old female who presents today with swelling underneath her jaw on her right side. She was seen in the office 4 days ago for her physcial and noticed 2 days after. States that she can feel that something is there but it is not super painful or causing any issues. She did get her cheeks pierced 4 weeks ago but they are not infected. She said that she was changing her nose piercing out and scratched the inside of her nose. No fevers, drainage from nose, reduced saliva, growth with eating.      Review of Systems   Constitutional:  Negative for chills, fatigue and fever.   HENT:  Negative for congestion, ear pain and sore throat.         -Enlarged lump on right side under her mouth  -Cut inside right nostril   Eyes:  Negative for pain.   Respiratory:  Negative for cough and shortness of breath.    Cardiovascular:  Negative for chest pain and palpitations.   Gastrointestinal:  Negative for abdominal pain, constipation,  "diarrhea, nausea and vomiting.   Genitourinary:  Negative for dysuria and hematuria.   Musculoskeletal:  Negative for arthralgias and back pain.   Skin:  Negative for color change and rash.   Neurological:  Negative for syncope, numbness and headaches.   All other systems reviewed and are negative.      Objective {?Quick Links Trend Vitals * Enter New Vitals * Results Review * Timeline (Adult) * Labs * Imaging * Cardiology * Procedures * Lung Cancer Screening * Surgical eConsent :76940}  /84 (Patient Position: Sitting, Cuff Size: Large)   Pulse 80   Temp 99.1 °F (37.3 °C) (Tympanic)   Resp 20   Ht 5' 3\" (1.6 m)   Wt 115 kg (253 lb)   LMP  (LMP Unknown)   SpO2 95%   Breastfeeding No   BMI 44.82 kg/m²      Physical Exam  Vitals and nursing note reviewed.   Constitutional:       General: She is not in acute distress.     Appearance: Normal appearance. She is well-developed.   HENT:      Head: Normocephalic and atraumatic.      Right Ear: Tympanic membrane normal.      Left Ear: Tympanic membrane normal.      Nose:      Comments: Small laceration to the right nostril. Erythema around the wound noted. No drainage or active signs of infection     Mouth/Throat:      Mouth: Mucous membranes are moist.      Comments: Slightly enlarged lymph node under mouth on right side  Eyes:      Conjunctiva/sclera: Conjunctivae normal.   Cardiovascular:      Rate and Rhythm: Normal rate and regular rhythm.      Heart sounds: Normal heart sounds. No murmur heard.  Pulmonary:      Effort: Pulmonary effort is normal. No respiratory distress.      Breath sounds: Normal breath sounds. No wheezing or rhonchi.   Abdominal:      Palpations: Abdomen is soft.      Tenderness: There is no abdominal tenderness.   Musculoskeletal:         General: No swelling.      Cervical back: Neck supple.   Skin:     General: Skin is warm and dry.      Capillary Refill: Capillary refill takes less than 2 seconds.   Neurological:      Mental Status: " She is alert and oriented to person, place, and time.   Psychiatric:         Mood and Affect: Mood normal.         Behavior: Behavior normal.         Thought Content: Thought content normal.         Judgment: Judgment normal.       Administrative Statements {?Quick Links Full Problem List * Level of Service * [facility]:51516}  I have spent a total time of 25 minutes in caring for this patient on the day of the visit/encounter including Diagnostic results, Prognosis, Risks and benefits of tx options, Instructions for management, Patient and family education, Importance of tx compliance, Risk factor reductions, Impressions, Counseling / Coordination of care, Documenting in the medical record, Reviewing / ordering tests, medicine, procedures  , and Obtaining or reviewing history  . Topics discussed with the patient / family include symptom assessment and management and medication review.

## 2024-12-16 ENCOUNTER — APPOINTMENT (OUTPATIENT)
Dept: LAB | Facility: CLINIC | Age: 29
End: 2024-12-16

## 2024-12-16 DIAGNOSIS — Z00.6 ENCOUNTER FOR EXAMINATION FOR NORMAL COMPARISON OR CONTROL IN CLINICAL RESEARCH PROGRAM: ICD-10-CM

## 2024-12-16 PROCEDURE — 36415 COLL VENOUS BLD VENIPUNCTURE: CPT

## 2024-12-19 DIAGNOSIS — I47.19 JUNCTIONAL TACHYCARDIA (HCC): ICD-10-CM

## 2024-12-20 RX ORDER — FLECAINIDE ACETATE 50 MG/1
50 TABLET ORAL 3 TIMES DAILY
Qty: 270 TABLET | Refills: 3 | Status: SHIPPED | OUTPATIENT
Start: 2024-12-20

## 2024-12-23 ENCOUNTER — HOSPITAL ENCOUNTER (OUTPATIENT)
Dept: NON INVASIVE DIAGNOSTICS | Facility: HOSPITAL | Age: 29
Discharge: HOME | End: 2024-12-23
Payer: COMMERCIAL

## 2024-12-23 VITALS
HEART RATE: 74 BPM | SYSTOLIC BLOOD PRESSURE: 136 MMHG | WEIGHT: 253 LBS | DIASTOLIC BLOOD PRESSURE: 84 MMHG | HEIGHT: 63 IN | BODY MASS INDEX: 44.83 KG/M2

## 2024-12-23 DIAGNOSIS — I47.19 JUNCTIONAL TACHYCARDIA (HCC): Chronic | ICD-10-CM

## 2024-12-23 LAB
AORTIC ROOT: 2.4 CM
ASCENDING AORTA: 3.1 CM
BSA FOR ECHO PROCEDURE: 2.14 M2
E WAVE DECELERATION TIME: 212 MS
E/A RATIO: 1.67
FRACTIONAL SHORTENING: 31 (ref 28–44)
INTERVENTRICULAR SEPTUM IN DIASTOLE (PARASTERNAL SHORT AXIS VIEW): 1 CM
INTERVENTRICULAR SEPTUM: 1 CM (ref 0.6–1.1)
IVC: 19 MM
LAAS-AP2: 12.3 CM2
LAAS-AP4: 17.3 CM2
LEFT ATRIUM SIZE: 3.5 CM
LEFT ATRIUM VOLUME (MOD BIPLANE): 37 ML
LEFT ATRIUM VOLUME INDEX (MOD BIPLANE): 17.3 ML/M2
LEFT INTERNAL DIMENSION IN SYSTOLE: 3.1 CM (ref 2.1–4)
LEFT VENTRICULAR INTERNAL DIMENSION IN DIASTOLE: 4.5 CM (ref 3.5–6)
LEFT VENTRICULAR POSTERIOR WALL IN END DIASTOLE: 1 CM
LEFT VENTRICULAR STROKE VOLUME: 53 ML
LVSV (TEICH): 53 ML
MV E'TISSUE VEL-SEP: 12 CM/S
MV PEAK A VEL: 0.75 M/S
MV PEAK E VEL: 125 CM/S
MV STENOSIS PRESSURE HALF TIME: 61 MS
MV VALVE AREA P 1/2 METHOD: 3.61
RIGHT ATRIUM AREA SYSTOLE A4C: 11.3 CM2
RIGHT VENTRICLE ID DIMENSION: 3.5 CM
SL CV LEFT ATRIUM LENGTH A2C: 4.5 CM
SL CV LV EF: 60
SL CV PED ECHO LEFT VENTRICLE DIASTOLIC VOLUME (MOD BIPLANE) 2D: 91 ML
SL CV PED ECHO LEFT VENTRICLE SYSTOLIC VOLUME (MOD BIPLANE) 2D: 38 ML
TR MAX PG: 13 MMHG
TR PEAK VELOCITY: 1.8 M/S
TRICUSPID ANNULAR PLANE SYSTOLIC EXCURSION: 2.5 CM
TRICUSPID VALVE PEAK REGURGITATION VELOCITY: 1.81 M/S

## 2024-12-23 PROCEDURE — 93306 TTE W/DOPPLER COMPLETE: CPT

## 2024-12-23 PROCEDURE — 93306 TTE W/DOPPLER COMPLETE: CPT | Performed by: INTERNAL MEDICINE

## 2024-12-30 LAB
APOB+LDLR+PCSK9 GENE MUT ANL BLD/T: NOT DETECTED
BRCA1+BRCA2 DEL+DUP + FULL MUT ANL BLD/T: NOT DETECTED
MLH1+MSH2+MSH6+PMS2 GN DEL+DUP+FUL M: NOT DETECTED

## 2025-01-06 ENCOUNTER — OFFICE VISIT (OUTPATIENT)
Dept: CARDIOLOGY CLINIC | Facility: CLINIC | Age: 30
End: 2025-01-06
Payer: COMMERCIAL

## 2025-01-06 VITALS
SYSTOLIC BLOOD PRESSURE: 124 MMHG | HEIGHT: 63 IN | WEIGHT: 258 LBS | BODY MASS INDEX: 45.71 KG/M2 | HEART RATE: 68 BPM | DIASTOLIC BLOOD PRESSURE: 90 MMHG

## 2025-01-06 DIAGNOSIS — R53.82 CHRONIC FATIGUE: Chronic | ICD-10-CM

## 2025-01-06 DIAGNOSIS — R07.89 OTHER CHEST PAIN: Chronic | ICD-10-CM

## 2025-01-06 DIAGNOSIS — I10 ESSENTIAL HYPERTENSION: Primary | Chronic | ICD-10-CM

## 2025-01-06 DIAGNOSIS — I47.19 JUNCTIONAL TACHYCARDIA (HCC): Chronic | ICD-10-CM

## 2025-01-06 DIAGNOSIS — K59.09 CHRONIC CONSTIPATION: Chronic | ICD-10-CM

## 2025-01-06 PROCEDURE — 99214 OFFICE O/P EST MOD 30 MIN: CPT | Performed by: INTERNAL MEDICINE

## 2025-01-06 NOTE — ASSESSMENT & PLAN NOTE
BP ok.  We talked about carb avoidance as a way to lose a little bit of weight and avoid needing more pills for blood pressure in the future.

## 2025-01-06 NOTE — PROGRESS NOTES
" Patient ID: Sonia Longo is a 29 y.o. female.        Plan:      Assessment & Plan  Junctional tachycardia (HCC)  No significant further spells.  She remains on flecainide with good results.  Other chest pain  Recent very sharp pains that last a second or 2 that do not seem cardiac in nature.  Chronic fatigue  Improved off beta-blocker.  Essential hypertension  BP ok.  We talked about carb avoidance as a way to lose a little bit of weight and avoid needing more pills for blood pressure in the future.      Follow up Plan/Other summary comments:  Return in about 1 year (around 2026).    HPI: Patient is seen in follow-up today regarding the above.  Since the last visit she was in the ED for noncardiac chest pain.  Symptoms lasted for seconds.  Otherwise she feels reasonably well.  No recent change in exertional tolerance.  No syncope or near syncope.            Most recent or relevant cardiac/vascular testing:    TTE 2024: Normal.  EKG 2024: Normal.  Normal intervals.      Past Surgical History:   Procedure Laterality Date    APPENDECTOMY       SECTION      WISDOM TOOTH EXTRACTION      all 4       Lipid Profile: Reviewed      Review of Systems   10  point ROS  was otherwise non pertinent or negative except as per HPI or as below.   Gait:  Normal.        Objective:     /90   Pulse 68   Ht 5' 3\" (1.6 m)   Wt 117 kg (258 lb)   LMP  (LMP Unknown)   BMI 45.70 kg/m²     PHYSICAL EXAM:    General:  Normal appearance in no distress.  Eyes:  Anicteric.  Oral mucosa:  Moist.  Neck:  No JVD. Carotid upstrokes are brisk without bruits.  No masses.  Chest:  Clear to auscultation.  Cardiac:  No palpable PMI.  Normal S1 and S2.  No murmur gallop or rub.  Abdomen:  Soft and nontender. No palpable organomegaly or aortic enlargement.  Extremities:  No peripheral edema.  Musculoskeletal:  Symmetric.   Vascular:  Femoral pulses are brisk without bruits.  Popliteal pulses are intact bilaterally. "   Pedal pulses are intact.  Neuro:  Grossly symmetric.  Psych:  Alert and oriented x3.      Meds reviewed.    Past Medical History:   Diagnosis Date    ADHD     Allergic     Anxiety     Depression     Hidradenitis suppurativa     Hypertension     Junctional tachycardia (HCC)     Obesity            Social History     Tobacco Use   Smoking Status Former    Current packs/day: 0.00    Average packs/day: 0.3 packs/day for 10.0 years (2.5 ttl pk-yrs)    Types: Cigarettes    Start date:     Quit date:     Years since quittin.0   Smokeless Tobacco Never